# Patient Record
Sex: FEMALE | Race: WHITE | Employment: FULL TIME | URBAN - METROPOLITAN AREA
[De-identification: names, ages, dates, MRNs, and addresses within clinical notes are randomized per-mention and may not be internally consistent; named-entity substitution may affect disease eponyms.]

---

## 2020-05-05 PROBLEM — F41.8 DEPRESSION WITH ANXIETY: Status: ACTIVE | Noted: 2020-05-05

## 2020-05-05 PROBLEM — Z34.90 PREGNANCY: Status: ACTIVE | Noted: 2020-05-05

## 2020-05-05 PROBLEM — Z82.79 FAMILY HISTORY OF CONGENITAL HEART DEFECT: Status: ACTIVE | Noted: 2020-05-05

## 2020-05-05 PROBLEM — F17.200 SMOKER: Status: ACTIVE | Noted: 2020-05-05

## 2020-05-05 PROBLEM — F90.9 ADHD: Status: ACTIVE | Noted: 2020-05-05

## 2020-05-05 PROBLEM — O99.330 TOBACCO SMOKING AFFECTING PREGNANCY: Status: ACTIVE | Noted: 2020-05-05

## 2020-07-27 PROBLEM — O09.92 HIGH-RISK PREGNANCY IN SECOND TRIMESTER: Status: ACTIVE | Noted: 2020-05-05

## 2020-07-27 PROBLEM — O99.340 DEPRESSION AFFECTING PREGNANCY: Status: ACTIVE | Noted: 2020-05-05

## 2020-07-27 PROBLEM — O35.2XX0 HEREDITARY DISEASE IN FAMILY POSSIBLY AFFECTING FETUS: Status: ACTIVE | Noted: 2020-05-05

## 2020-07-27 PROBLEM — F90.9 ADHD: Status: RESOLVED | Noted: 2020-05-05 | Resolved: 2020-07-27

## 2020-07-27 PROBLEM — O99.332 TOBACCO SMOKING AFFECTING PREGNANCY IN SECOND TRIMESTER: Status: ACTIVE | Noted: 2020-05-05

## 2020-07-27 PROBLEM — F32.A DEPRESSION AFFECTING PREGNANCY: Status: ACTIVE | Noted: 2020-05-05

## 2020-12-01 PROBLEM — N90.4 VULVAR DYSTROPHY: Status: ACTIVE | Noted: 2020-12-01

## 2020-12-06 ENCOUNTER — ANESTHESIA EVENT (OUTPATIENT)
Dept: LABOR AND DELIVERY | Age: 20
End: 2020-12-06
Payer: COMMERCIAL

## 2020-12-07 ENCOUNTER — ANESTHESIA (OUTPATIENT)
Dept: LABOR AND DELIVERY | Age: 20
End: 2020-12-07
Payer: COMMERCIAL

## 2020-12-07 ENCOUNTER — HOSPITAL ENCOUNTER (INPATIENT)
Age: 20
LOS: 2 days | Discharge: HOME | End: 2020-12-09
Attending: OBSTETRICS & GYNECOLOGY | Admitting: OBSTETRICS & GYNECOLOGY
Payer: COMMERCIAL

## 2020-12-07 PROBLEM — Z3A.39 39 WEEKS GESTATION OF PREGNANCY: Status: ACTIVE | Noted: 2020-12-07

## 2020-12-07 LAB
ABO + RH BLD: NORMAL
ARTERIAL PATENCY WRIST A: ABNORMAL
ARTERIAL PATENCY WRIST A: ABNORMAL
BASE DEFICIT BLD-SCNC: 2 MMOL/L
BASE DEFICIT BLD-SCNC: 2 MMOL/L
BDY SITE: ABNORMAL
BDY SITE: ABNORMAL
BLOOD GROUP ANTIBODIES SERPL: NORMAL
CA-I BLD-MCNC: 1.55 MMOL/L (ref 1.12–1.32)
CA-I BLD-MCNC: 1.58 MMOL/L (ref 1.12–1.32)
COLLECT TIME,HTIME: 945
COLLECT TIME,HTIME: 945
ERYTHROCYTE [DISTWIDTH] IN BLOOD BY AUTOMATED COUNT: 13.5 % (ref 11.9–14.6)
GAS FLOW.O2 O2 DELIVERY SYS: ABNORMAL L/MIN
GAS FLOW.O2 O2 DELIVERY SYS: ABNORMAL L/MIN
GLUCOSE BLD STRIP.AUTO-MCNC: 60 MG/DL (ref 65–100)
GLUCOSE BLD STRIP.AUTO-MCNC: 71 MG/DL (ref 65–100)
HCO3 BLD-SCNC: 23.9 MMOL/L (ref 22–26)
HCO3 BLD-SCNC: 26.3 MMOL/L (ref 22–26)
HCT VFR BLD AUTO: 31.2 % (ref 35.8–46.3)
HGB BLD-MCNC: 10.1 G/DL (ref 11.7–15.4)
MCH RBC QN AUTO: 28.4 PG (ref 26.1–32.9)
MCHC RBC AUTO-ENTMCNC: 32.4 G/DL (ref 31.4–35)
MCV RBC AUTO: 87.6 FL (ref 79.6–97.8)
NRBC # BLD: 0.03 K/UL (ref 0–0.2)
PCO2 BLD: 43.1 MMHG (ref 35–45)
PCO2 BLD: 54.8 MMHG (ref 35–45)
PH BLD: 7.29 [PH] (ref 7.35–7.45)
PH BLD: 7.35 [PH] (ref 7.35–7.45)
PLATELET # BLD AUTO: 242 K/UL (ref 150–450)
PMV BLD AUTO: 12.3 FL (ref 9.4–12.3)
PO2 BLD: 13 MMHG (ref 75–100)
PO2 BLD: 20 MMHG (ref 75–100)
POTASSIUM BLD-SCNC: 5.3 MMOL/L (ref 3.5–5.1)
POTASSIUM BLD-SCNC: 5.4 MMOL/L (ref 3.5–5.1)
RBC # BLD AUTO: 3.56 M/UL (ref 4.05–5.2)
SAO2 % BLD: 13 % (ref 95–98)
SAO2 % BLD: 30 % (ref 95–98)
SERVICE CMNT-IMP: ABNORMAL
SERVICE CMNT-IMP: ABNORMAL
SODIUM BLD-SCNC: 137 MMOL/L (ref 136–145)
SODIUM BLD-SCNC: 138 MMOL/L (ref 136–145)
SPECIMEN EXP DATE BLD: NORMAL
SPECIMEN TYPE: ABNORMAL
SPECIMEN TYPE: ABNORMAL
WBC # BLD AUTO: 10.9 K/UL (ref 4.3–11.1)

## 2020-12-07 PROCEDURE — 74011250636 HC RX REV CODE- 250/636: Performed by: REGISTERED NURSE

## 2020-12-07 PROCEDURE — 65270000029 HC RM PRIVATE

## 2020-12-07 PROCEDURE — 2709999900 HC NON-CHARGEABLE SUPPLY

## 2020-12-07 PROCEDURE — 2709999900 HC NON-CHARGEABLE SUPPLY: Performed by: OBSTETRICS & GYNECOLOGY

## 2020-12-07 PROCEDURE — 82947 ASSAY GLUCOSE BLOOD QUANT: CPT

## 2020-12-07 PROCEDURE — 74011250636 HC RX REV CODE- 250/636: Performed by: ANESTHESIOLOGY

## 2020-12-07 PROCEDURE — 77030031139 HC SUT VCRL2 J&J -A: Performed by: OBSTETRICS & GYNECOLOGY

## 2020-12-07 PROCEDURE — 74011000250 HC RX REV CODE- 250: Performed by: REGISTERED NURSE

## 2020-12-07 PROCEDURE — 74011250637 HC RX REV CODE- 250/637: Performed by: ANESTHESIOLOGY

## 2020-12-07 PROCEDURE — 76010000391 HC C SECN FIRST 1 HR: Performed by: OBSTETRICS & GYNECOLOGY

## 2020-12-07 PROCEDURE — 82803 BLOOD GASES ANY COMBINATION: CPT

## 2020-12-07 PROCEDURE — 75410000003 HC RECOV DEL/VAG/CSECN EA 0.5 HR: Performed by: OBSTETRICS & GYNECOLOGY

## 2020-12-07 PROCEDURE — 77030002933 HC SUT MCRYL J&J -A: Performed by: OBSTETRICS & GYNECOLOGY

## 2020-12-07 PROCEDURE — 86900 BLOOD TYPING SEROLOGIC ABO: CPT

## 2020-12-07 PROCEDURE — 77030007880 HC KT SPN EPDRL BBMI -B: Performed by: ANESTHESIOLOGY

## 2020-12-07 PROCEDURE — 77030003665 HC NDL SPN BBMI -A: Performed by: ANESTHESIOLOGY

## 2020-12-07 PROCEDURE — 74011000258 HC RX REV CODE- 258: Performed by: OBSTETRICS & GYNECOLOGY

## 2020-12-07 PROCEDURE — 77030034696 HC CATH URETH FOL 2W BARD -A: Performed by: OBSTETRICS & GYNECOLOGY

## 2020-12-07 PROCEDURE — 74011000250 HC RX REV CODE- 250: Performed by: ANESTHESIOLOGY

## 2020-12-07 PROCEDURE — 77030032490 HC SLV COMPR SCD KNE COVD -B: Performed by: OBSTETRICS & GYNECOLOGY

## 2020-12-07 PROCEDURE — 74011250636 HC RX REV CODE- 250/636

## 2020-12-07 PROCEDURE — 76010000392 HC C SECN EA ADDL 0.5 HR: Performed by: OBSTETRICS & GYNECOLOGY

## 2020-12-07 PROCEDURE — 74011250637 HC RX REV CODE- 250/637: Performed by: OBSTETRICS & GYNECOLOGY

## 2020-12-07 PROCEDURE — 77030009363 HC CUP VAC EXTRCT CNCI -B: Performed by: OBSTETRICS & GYNECOLOGY

## 2020-12-07 PROCEDURE — 77030011943: Performed by: OBSTETRICS & GYNECOLOGY

## 2020-12-07 PROCEDURE — 85027 COMPLETE CBC AUTOMATED: CPT

## 2020-12-07 PROCEDURE — 76060000078 HC EPIDURAL ANESTHESIA: Performed by: OBSTETRICS & GYNECOLOGY

## 2020-12-07 PROCEDURE — 74011250636 HC RX REV CODE- 250/636: Performed by: OBSTETRICS & GYNECOLOGY

## 2020-12-07 RX ORDER — DEXAMETHASONE SODIUM PHOSPHATE 100 MG/10ML
INJECTION INTRAMUSCULAR; INTRAVENOUS AS NEEDED
Status: DISCONTINUED | OUTPATIENT
Start: 2020-12-07 | End: 2020-12-07 | Stop reason: HOSPADM

## 2020-12-07 RX ORDER — TRISODIUM CITRATE DIHYDRATE AND CITRIC ACID MONOHYDRATE 500; 334 MG/5ML; MG/5ML
30 SOLUTION ORAL ONCE
Status: COMPLETED | OUTPATIENT
Start: 2020-12-07 | End: 2020-12-07

## 2020-12-07 RX ORDER — ACETAMINOPHEN 500 MG
1000 TABLET ORAL EVERY 8 HOURS
Status: COMPLETED | OUTPATIENT
Start: 2020-12-07 | End: 2020-12-08

## 2020-12-07 RX ORDER — OXYTOCIN/RINGER'S LACTATE 30/500 ML
95 PLASTIC BAG, INJECTION (ML) INTRAVENOUS AS NEEDED
Status: DISCONTINUED | OUTPATIENT
Start: 2020-12-07 | End: 2020-12-07 | Stop reason: HOSPADM

## 2020-12-07 RX ORDER — MORPHINE SULFATE 1 MG/ML
INJECTION, SOLUTION EPIDURAL; INTRATHECAL; INTRAVENOUS
Status: COMPLETED | OUTPATIENT
Start: 2020-12-07 | End: 2020-12-07

## 2020-12-07 RX ORDER — DEXTROSE, SODIUM CHLORIDE, SODIUM LACTATE, POTASSIUM CHLORIDE, AND CALCIUM CHLORIDE 5; .6; .31; .03; .02 G/100ML; G/100ML; G/100ML; G/100ML; G/100ML
125 INJECTION, SOLUTION INTRAVENOUS CONTINUOUS
Status: DISCONTINUED | OUTPATIENT
Start: 2020-12-07 | End: 2020-12-07 | Stop reason: HOSPADM

## 2020-12-07 RX ORDER — FAMOTIDINE 20 MG/1
20 TABLET, FILM COATED ORAL ONCE
Status: COMPLETED | OUTPATIENT
Start: 2020-12-07 | End: 2020-12-07

## 2020-12-07 RX ORDER — OXYTOCIN/RINGER'S LACTATE 30/500 ML
10 PLASTIC BAG, INJECTION (ML) INTRAVENOUS AS NEEDED
Status: DISCONTINUED | OUTPATIENT
Start: 2020-12-07 | End: 2020-12-07 | Stop reason: HOSPADM

## 2020-12-07 RX ORDER — SODIUM CHLORIDE, SODIUM LACTATE, POTASSIUM CHLORIDE, CALCIUM CHLORIDE 600; 310; 30; 20 MG/100ML; MG/100ML; MG/100ML; MG/100ML
INJECTION, SOLUTION INTRAVENOUS
Status: DISCONTINUED | OUTPATIENT
Start: 2020-12-07 | End: 2020-12-07 | Stop reason: HOSPADM

## 2020-12-07 RX ORDER — ONDANSETRON 2 MG/ML
4 INJECTION INTRAMUSCULAR; INTRAVENOUS ONCE
Status: COMPLETED | OUTPATIENT
Start: 2020-12-07 | End: 2020-12-07

## 2020-12-07 RX ORDER — BUPIVACAINE HYDROCHLORIDE 7.5 MG/ML
INJECTION, SOLUTION INTRASPINAL
Status: COMPLETED | OUTPATIENT
Start: 2020-12-07 | End: 2020-12-07

## 2020-12-07 RX ORDER — SODIUM CHLORIDE 0.9 % (FLUSH) 0.9 %
5-40 SYRINGE (ML) INJECTION AS NEEDED
Status: DISCONTINUED | OUTPATIENT
Start: 2020-12-07 | End: 2020-12-07 | Stop reason: HOSPADM

## 2020-12-07 RX ORDER — HYDROMORPHONE HYDROCHLORIDE 1 MG/ML
1 INJECTION, SOLUTION INTRAMUSCULAR; INTRAVENOUS; SUBCUTANEOUS
Status: ACTIVE | OUTPATIENT
Start: 2020-12-07 | End: 2020-12-08

## 2020-12-07 RX ORDER — DIPHENHYDRAMINE HYDROCHLORIDE 50 MG/ML
12.5 INJECTION, SOLUTION INTRAMUSCULAR; INTRAVENOUS
Status: DISPENSED | OUTPATIENT
Start: 2020-12-07 | End: 2020-12-08

## 2020-12-07 RX ORDER — OXYCODONE HYDROCHLORIDE 5 MG/1
5 TABLET ORAL
Status: DISPENSED | OUTPATIENT
Start: 2020-12-07 | End: 2020-12-08

## 2020-12-07 RX ORDER — CLINDAMYCIN PHOSPHATE 900 MG/50ML
900 INJECTION INTRAVENOUS
Status: COMPLETED | OUTPATIENT
Start: 2020-12-07 | End: 2020-12-08

## 2020-12-07 RX ORDER — BUPROPION HYDROCHLORIDE 150 MG/1
150 TABLET, EXTENDED RELEASE ORAL 2 TIMES DAILY
Status: DISCONTINUED | OUTPATIENT
Start: 2020-12-07 | End: 2020-12-09 | Stop reason: HOSPADM

## 2020-12-07 RX ORDER — KETOROLAC TROMETHAMINE 30 MG/ML
INJECTION, SOLUTION INTRAMUSCULAR; INTRAVENOUS AS NEEDED
Status: DISCONTINUED | OUTPATIENT
Start: 2020-12-07 | End: 2020-12-07 | Stop reason: HOSPADM

## 2020-12-07 RX ORDER — KETOROLAC TROMETHAMINE 30 MG/ML
30 INJECTION, SOLUTION INTRAMUSCULAR; INTRAVENOUS
Status: DISPENSED | OUTPATIENT
Start: 2020-12-07 | End: 2020-12-08

## 2020-12-07 RX ORDER — CEFAZOLIN SODIUM/WATER 2 G/20 ML
2 SYRINGE (ML) INTRAVENOUS ONCE
Status: DISCONTINUED | OUTPATIENT
Start: 2020-12-07 | End: 2020-12-07

## 2020-12-07 RX ORDER — SODIUM CHLORIDE 0.9 % (FLUSH) 0.9 %
5-40 SYRINGE (ML) INJECTION EVERY 8 HOURS
Status: DISCONTINUED | OUTPATIENT
Start: 2020-12-07 | End: 2020-12-07 | Stop reason: HOSPADM

## 2020-12-07 RX ORDER — OXYTOCIN/RINGER'S LACTATE 30/500 ML
PLASTIC BAG, INJECTION (ML) INTRAVENOUS
Status: DISCONTINUED | OUTPATIENT
Start: 2020-12-07 | End: 2020-12-07 | Stop reason: HOSPADM

## 2020-12-07 RX ORDER — SODIUM CHLORIDE, SODIUM LACTATE, POTASSIUM CHLORIDE, CALCIUM CHLORIDE 600; 310; 30; 20 MG/100ML; MG/100ML; MG/100ML; MG/100ML
125 INJECTION, SOLUTION INTRAVENOUS CONTINUOUS
Status: DISCONTINUED | OUTPATIENT
Start: 2020-12-07 | End: 2020-12-08 | Stop reason: ALTCHOICE

## 2020-12-07 RX ORDER — PROMETHAZINE HYDROCHLORIDE 25 MG/ML
INJECTION, SOLUTION INTRAMUSCULAR; INTRAVENOUS AS NEEDED
Status: DISCONTINUED | OUTPATIENT
Start: 2020-12-07 | End: 2020-12-07 | Stop reason: HOSPADM

## 2020-12-07 RX ORDER — ONDANSETRON 2 MG/ML
INJECTION INTRAMUSCULAR; INTRAVENOUS
Status: DISCONTINUED
Start: 2020-12-07 | End: 2020-12-07

## 2020-12-07 RX ADMIN — PHENYLEPHRINE HYDROCHLORIDE 80 MCG: 10 INJECTION INTRAVENOUS at 09:42

## 2020-12-07 RX ADMIN — ACETAMINOPHEN 1000 MG: 500 TABLET, FILM COATED ORAL at 21:18

## 2020-12-07 RX ADMIN — SODIUM CHLORIDE, SODIUM LACTATE, POTASSIUM CHLORIDE, AND CALCIUM CHLORIDE 1000 ML: 600; 310; 30; 20 INJECTION, SOLUTION INTRAVENOUS at 07:20

## 2020-12-07 RX ADMIN — PHENYLEPHRINE HYDROCHLORIDE 160 MCG: 10 INJECTION INTRAVENOUS at 09:31

## 2020-12-07 RX ADMIN — PROMETHAZINE HYDROCHLORIDE 6.25 MG: 25 INJECTION INTRAMUSCULAR; INTRAVENOUS at 10:02

## 2020-12-07 RX ADMIN — PHENYLEPHRINE HYDROCHLORIDE 160 MCG: 10 INJECTION INTRAVENOUS at 09:50

## 2020-12-07 RX ADMIN — PHENYLEPHRINE HYDROCHLORIDE 80 MCG: 10 INJECTION INTRAVENOUS at 09:37

## 2020-12-07 RX ADMIN — SODIUM CHLORIDE, SODIUM LACTATE, POTASSIUM CHLORIDE, AND CALCIUM CHLORIDE: 600; 310; 30; 20 INJECTION, SOLUTION INTRAVENOUS at 09:20

## 2020-12-07 RX ADMIN — DIPHENHYDRAMINE HYDROCHLORIDE 12.5 MG: 50 INJECTION, SOLUTION INTRAMUSCULAR; INTRAVENOUS at 22:18

## 2020-12-07 RX ADMIN — ACETAMINOPHEN 1000 MG: 500 TABLET, FILM COATED ORAL at 13:02

## 2020-12-07 RX ADMIN — Medication 500 ML/HR: at 09:46

## 2020-12-07 RX ADMIN — PHENYLEPHRINE HYDROCHLORIDE 160 MCG: 10 INJECTION INTRAVENOUS at 09:46

## 2020-12-07 RX ADMIN — DEXAMETHASONE SODIUM PHOSPHATE 10 MG: 10 INJECTION INTRAMUSCULAR; INTRAVENOUS at 09:54

## 2020-12-07 RX ADMIN — SODIUM CITRATE AND CITRIC ACID MONOHYDRATE 30 ML: 500; 334 SOLUTION ORAL at 08:58

## 2020-12-07 RX ADMIN — PHENYLEPHRINE HYDROCHLORIDE 80 MCG: 10 INJECTION INTRAVENOUS at 09:39

## 2020-12-07 RX ADMIN — BUPIVACAINE HYDROCHLORIDE IN DEXTROSE 12 MG: 7.5 INJECTION, SOLUTION SUBARACHNOID at 09:27

## 2020-12-07 RX ADMIN — KETOROLAC TROMETHAMINE 30 MG: 30 INJECTION, SOLUTION INTRAMUSCULAR at 21:18

## 2020-12-07 RX ADMIN — BUPROPION HYDROCHLORIDE 150 MG: 150 TABLET, EXTENDED RELEASE ORAL at 21:20

## 2020-12-07 RX ADMIN — MORPHINE SULFATE 0.15 MG: 1 INJECTION, SOLUTION EPIDURAL; INTRATHECAL; INTRAVENOUS at 09:27

## 2020-12-07 RX ADMIN — SODIUM CHLORIDE, SODIUM LACTATE, POTASSIUM CHLORIDE, AND CALCIUM CHLORIDE: 600; 310; 30; 20 INJECTION, SOLUTION INTRAVENOUS at 10:11

## 2020-12-07 RX ADMIN — ONDANSETRON 4 MG: 2 INJECTION INTRAMUSCULAR; INTRAVENOUS at 08:57

## 2020-12-07 RX ADMIN — PHENYLEPHRINE HYDROCHLORIDE 160 MCG: 10 INJECTION INTRAVENOUS at 09:35

## 2020-12-07 RX ADMIN — SODIUM CHLORIDE 500 ML: 900 INJECTION, SOLUTION INTRAVENOUS at 16:49

## 2020-12-07 RX ADMIN — PHENYLEPHRINE HYDROCHLORIDE 160 MCG: 10 INJECTION INTRAVENOUS at 09:33

## 2020-12-07 RX ADMIN — KETOROLAC TROMETHAMINE 30 MG: 30 INJECTION, SOLUTION INTRAMUSCULAR; INTRAVENOUS at 10:09

## 2020-12-07 RX ADMIN — CLINDAMYCIN IN 5 PERCENT DEXTROSE 900 MG: 18 INJECTION, SOLUTION INTRAVENOUS at 08:58

## 2020-12-07 RX ADMIN — GENTAMICIN SULFATE 360 MG: 40 INJECTION, SOLUTION INTRAMUSCULAR; INTRAVENOUS at 09:11

## 2020-12-07 RX ADMIN — SODIUM CHLORIDE, SODIUM LACTATE, POTASSIUM CHLORIDE, AND CALCIUM CHLORIDE 125 ML/HR: 600; 310; 30; 20 INJECTION, SOLUTION INTRAVENOUS at 17:26

## 2020-12-07 RX ADMIN — FAMOTIDINE 20 MG: 20 TABLET, FILM COATED ORAL at 08:59

## 2020-12-07 NOTE — PROGRESS NOTES
SBAR OUT Report: Mother    Verbal report given to Katya Vernon RN  (full name & credentials) on this patient, who is now being transferred to MIU (unit) for routine progression of care. The patient is wearing a green \"Anesthesia-Duramorph\" band. Report consisted of patient's Situation, Background, Assessment and Recommendations (SBAR). [place] ID bands were compared with the identification form, and verified with the patient and receiving nurse. Information from the SBAR and the [address] [facility] Report was reviewed with the receiving nurse; opportunity for questions and clarification provided.

## 2020-12-07 NOTE — OP NOTES
INTRAUTERINE PREGNANCY  SECTION     Pedro Mcpherson  [id number]    DATE OF PROCEDURE:  2020    PREOPERATIVE DIAGNOSIS:  INDU  Primary  Section presumed macrosomia    POSTOPERATIVE DIAGNOSIS:  Same as preoperative diagnosis      with operative delivery of a 10 lb 6 oz male with Apgars of 8 and 9. ADDITIONAL DIAGNOSES: none    PROCEDURE: Intrauterine pregnancy,  section low transverse    SURGEON:  Major Whitehead MD    ASSISTANT:  none    ANESTHESIA: Spinal    EBL: 615 ml    SPECIMENS:   ID Type Source Tests Collected by Time Destination   1 :  Placenta   Marcelino MD Doug 2020 1007 Discarded        COMPLICATIONS: none    OPERATIVE PROCEDURE: Patient was placed on the operating room table in the supine position/left lateral tilt, santiago catheter in place, pneumatic compression hose on and operating. Time out was done to confirm the operating procedure, surgeon, patient and site. Once confirmed by the team, procedure was started. The patient was prepped and draped in the usual fashion for abdominal surgery. Adequate anesthesia was confirmed, A Pfannenstiel incision was made and carried down sharply through the skin, subcutaneous tissue, and fascia. Fascia was sharply dissected free from underlying rectus muscles. The peritoneum was sharply entered and extended vertically with good visualization of bowel and bladder. The bladder blade was then placed over the bladder. The visceroperitoneal reflection over the lower uterine segment was incised transversely and the bladder flap sharply and bluntly developed. The bladder blade was reinserted in this space. A low transverse hysterotomy incision was made with return of clear fluid then extended bluntly, and the infants head was delivered from the pelvis with gentle fundal pressure and vacuum applied with one pull less than 30 seconds at 400mm/mercury. The cord was clamped and cut. Mouth and nose were suctioned clean.  The infant was given to the [facility] personel present at the time of delivery. The placenta was delivered with fundal massage. The uterus was exteriorized, wrapped in a wet lap square, curetted with a dry square, and closed in a double-layered fashion with 0-vicryl. Hemostasis appeared adequate. The cul-de-sac was then irrigated and suctioned clean. Bladder flap was irrigated. The uterus was replaced in the abdomen. The gutters were irrigated and suctioned clean. The hysterotomy incision was noted to be hemostatic. The peritoneum was closed with 3-0 vicryl and rectus muscle reapproximated with 0-vicryl. The subfascial layer made hemostatic with electrocautery. Fascia closed with #1-vicryl running. Subcutaneous tissue irrigated, noted to be hemostatic and reapproximated with 3-0 vicryl. Skin then closed with 4-0 monocryl in a subcuticular fashion. All sponge, lap, instrument, and needle counts correct times two. The patient tolerated the procedure well and went back to her room in satisfactory condition. Infant was with the mother.

## 2020-12-07 NOTE — ROUTINE PROCESS
SBAR IN Report: Mother    Verbal report received from Martha Wells RN on this patient, who is now being transferred from L&D (unit) for routine progression of care. The patient is wearing a green \"Anesthesia-Duramorph\" band. Report consisted of patient's Situation, Background, Assessment and Recommendations (SBAR).  ID bands were compared with the identification form, and verified with the patient and transferring nurse. Information from the SBAR, [place], OR Summary, Procedure Summary, Intake/Output, MAR and Recent Results and the [place] Report was reviewed with the transferring nurse; opportunity for questions and clarification provided.

## 2020-12-07 NOTE — ANESTHESIA POSTPROCEDURE EVALUATION
Procedure(s):   SECTION. spinal    Anesthesia Post Evaluation        Patient location during evaluation: floor  Patient participation: complete - patient participated  Level of consciousness: awake  Pain management: satisfactory to patient  Airway patency: patent  Anesthetic complications: no  Cardiovascular status: hemodynamically stable  Respiratory status: spontaneous ventilation  Hydration status: euvolemic  Post anesthesia nausea and vomiting:  none      INITIAL Post-op Vital signs: No vitals data found for the desired time range.

## 2020-12-07 NOTE — PROGRESS NOTES
Patient in MIU PACU   1025 SBAR from [employer].  CRNA, care of patient resumed   1030  Recovery began

## 2020-12-07 NOTE — ANESTHESIA PROCEDURE NOTES
Spinal Block    Performed by: Jen Goyal MD  Authorized by: Jen Goyal MD     Pre-procedure: Indications: primary anesthetic  Preanesthetic Checklist: patient identified, risks and benefits discussed, anesthesia consent, patient being monitored and timeout performed    Timeout Time: 09:23  Preanesthetic Checklist comment:  Risk of nerve damage discussed.     Spinal Block:   Patient Position:  Seated  Prep Region:  Lumbar  Prep: chlorhexidine and patient draped      Location:  L3-4  Technique:  Single shot    Local Dose (mL):  3    Needle:   Needle Type:  Pencan  Needle Gauge:  25 G  Attempts:  1      Events: CSF confirmed, no blood with aspiration and no paresthesia        Assessment:  Insertion:  Uncomplicated  Patient tolerance:  Patient tolerated the procedure well with no immediate complications

## 2020-12-07 NOTE — H&P
History & Physical    Name: Mikhail Rios MRN: [id number]  SSN: [id number]    YOB: 2000  Age: 23 y.o. Sex: female      Subjective:     Estimated Date of Delivery: 20  OB History    Para Term  AB Living   1 0 0 0 0 0   SAB TAB Ectopic Molar Multiple Live Births   0 0 0 0 0 0      # Outcome Date GA Lbr Jose Carlos/2nd Weight Sex Delivery Anes PTL Lv   1 Current                MsAfshan Gonzalez admitted with pregnancy at 39w2d for  section due to suspected macrosomia and unengaged presenting part. Prenatal course was normal. Please see prenatal records for details. Past Medical History:   Diagnosis Date    ADHD 2020    unmedicated    Depression with anxiety     Other ill-defined conditions(799.89) ENLARGED T/A    Psychiatric disorder ADHD     Past Surgical History:   Procedure Laterality Date    HX TONSILLECTOMY      HX WISDOM TEETH EXTRACTION       Social History     Occupational History    Not on file   Tobacco Use    Smoking status: Current Every Day Smoker     Packs/day: 0.25    Smokeless tobacco: Never Used   Substance and Sexual Activity    Alcohol use: No    Drug use: No    Sexual activity: Yes     Partners: Male     Birth control/protection: None     Family History   Problem Relation Age of Onset    Cancer Mother     No Known Problems Father     Malignant Hyperthermia Neg Hx     Pseudocholinesterase Deficiency Neg Hx     Delayed Awakening Neg Hx     Post-op Nausea/Vomiting Neg Hx     Emergence Delirium Neg Hx     Post-op Cognitive Dysfunction Neg Hx     Other Neg Hx        Allergies   Allergen Reactions    Penicillins Hives    Sulfamethoxazole-Trimethoprim Hives and Shortness of Breath     Prior to Admission medications    Medication Sig Start Date End Date Taking? Authorizing Provider   acetaminophen (Tylenol Extra Strength) 500 mg tablet Take  by mouth every six (6) hours as needed for Pain.    Yes Provider, Historical   buPROPion XL (WELLBUTRIN XL) 150 mg tablet Take 1 Tab by mouth every morning. 20  Yes Sanjiv Ribeiro MD   WTE82-Csbn Fumarate-FA-DSS-DHA 26-1.2- mg cap Take  by mouth. Yes Provider, Historical        Review of Systems: A comprehensive review of systems was negative except for that written in the History of Present Illness. Objective:     Vitals:  Vitals:    20 0727   BP: (!) 135/91   Pulse: (!) 102   Resp: 18   Temp: 97.9 °F (36.6 °C)   Weight: 220 lb (99.8 kg)   Height: 5' 3\" (1.6 m)        Physical Exam:  Patient without distress. Heart: Regular rate and rhythm  Lung: clear to auscultation throughout lung fields, no wheezes, no rales, no rhonchi and normal respiratory effort  Membranes:  Intact  Fetal Heart Rate: Reactive    Prenatal Labs:   Lab Results   Component Value Date/Time    ABO/Rh(D) A POSITIVE 2020 07:21 AM    Rubella, External immune 2020    HBsAg, External negative 2020    HIV, External NR 2020    RPR, External NR 2020    ABO,Rh A positive 2020         Impression/Plan:     Plan:  Admit for  section. Group B Strep was negative. Discussed the risks of surgery including the risks of bleeding, infection, deep vein thrombosis, and surgical injuries to internal organs including but not limited to the bowels, bladder, rectum, and female reproductive organs. The patient understands the risks; any and all questions were answered to the patient's satisfaction.   Prior reaction to PCN required treatment with steroids therefore will treat with Clinda and Chyna Reinoso

## 2020-12-07 NOTE — PROGRESS NOTES
Dr Toan Chaves notified of decreased urine output, /62, heart rate 60, one pad with moderate amount of lochia 2 hours ago now small lochia since. Telephone order received for 500 ml bolus of Normal Saline then restart LR as ordered. Read back.

## 2020-12-07 NOTE — L&D DELIVERY NOTE
Delivery Summary    Patient: Leonor Vega MRN: [id number]  SSN: [id number]    YOB: 2000  Age: 23 y.o. Sex: female        Information for the patient's :  Neisha Jolly [[id number]]       Labor Events:    Labor: No    Steroids: None   Cervical Ripening Date/Time:       Cervical Ripening Type: None   Antibiotics During Labor: No   Rupture Identifier:      Rupture Date/Time: 2020 9:44 AM   Rupture Type: AROM   Amniotic Fluid Volume: Moderate    Amniotic Fluid Description: Clear    Amniotic Fluid Odor: None    Induction: None       Induction Date/Time:        Indications for Induction:      Augmentation: None   Augmentation Date/Time:      Indications for Augmentation:     Labor complications: None       Additional complications:        Delivery Events:  Indications For Episiotomy:     Episiotomy: None   Perineal Laceration(s): None   Repaired:     Periurethral Laceration Location:      Repaired:     Labial Laceration Location:     Repaired:     Sulcal Laceration Location:     Repaired:     Vaginal Laceration Location:     Repaired:     Cervical Laceration Location:     Repaired:     Repair Suture: None   Number of Repair Packets:     Estimated Blood Loss (ml):  ml   Quantitaive Blood Loss (ml):             Delivery Date: 2020    Delivery Time: 9:45 AM   Delivery Type: , Low Transverse     Details    Trial of Labor: No   Primary/Repeat: Primary   Priority: Routine   Indications:  Macrosomia       Sex:  Male     Gestational Age: 44w2d  Delivery Clinician: Phong Heredia  Living Status: Living   Delivery Location: OR            APGARS  One minute Five minutes Ten minutes   Skin color: 0   1        Heart rate: 2   2        Grimace: 2   2        Muscle tone: 2   2        Breathin   2        Totals: 8   9          Presentation: Vertex    Position: Left Occiput Anterior  Resuscitation Method:  Suctioning-bulb; Tactile Stimulation Meconium Stained: None      Cord Information: 3 Vessels  Complications: None  Cord around:    Delayed cord clamping? No  Cord clamped date/time:2020  9:46 AM  Disposition of Cord Blood: Lab    Blood Gases Sent?: Yes    Placenta:  Date/Time: 2020  9:48 AM  Removal: Expressed      Appearance: Intact     [place] Measurements:  Birth Weight: 10 lb 6.1 oz (4.71 kg)      Birth Length: 1' 10.24\" (0.565 m)      Head Circumference: 1' 2.57\" (0.37 m)      Chest Circumference: 1' 2.76\" (0.375 m)     Abdominal Girth:       Other Providers:   Natalie CORTES;PASTOR MANCERA;EDY REYES;PASTOR LEIJA;FIFI GEORGE;KELSY ZAVALA;MARIANO ZHENG;DARCI ESQUIVEL;DAYA HOANG, Obstetrician;Primary Nurse;Primary [place] Nurse;Neonatologist;Anesthesiologist;Crna;Scrub Tech;Scrub Tech;Respiratory Therapist             Group B Strep: No results found for: GRBSEXT, GRBSEXT  Information for the patient's :  Roberta Duggan [[id number]]   No results found for: ABORH, PCTABR, PCTDIG, BILI, ABORHEXT, ABORH     Recent Labs     20  1009 20  1008   HCO3I 23.9 26.3*   SO2I 30* 13*   IBD 2 2   SPECTI VENOUS CORD ARTERIAL CORD   ISITE CORD CORD   IDEV OTHER OTHER   IALLEN NOT APPLICABLE NOT APPLICABLE      Vacuum applied for less than 30 sec at 400mmMercury

## 2020-12-07 NOTE — ANESTHESIA PREPROCEDURE EVALUATION
Relevant Problems   No relevant active problems       Anesthetic History   No history of anesthetic complications            Review of Systems / Medical History  Pertinent labs reviewed    Pulmonary          Smoker         Neuro/Psych         Psychiatric history     Cardiovascular  Within defined limits                Exercise tolerance: >4 METS     GI/Hepatic/Renal  Within defined limits              Endo/Other        Obesity     Other Findings              Physical Exam    Airway  Mallampati: II  TM Distance: 4 - 6 cm  Neck ROM: normal range of motion   Mouth opening: Normal     Cardiovascular  Regular rate and rhythm,  S1 and S2 normal,  no murmur, click, rub, or gallop             Dental  No notable dental hx       Pulmonary  Breath sounds clear to auscultation               Abdominal  GI exam deferred       Other Findings            Anesthetic Plan    ASA: 2  Anesthesia type: spinal            Anesthetic plan and risks discussed with: Patient and Spouse

## 2020-12-07 NOTE — PROGRESS NOTES
IV started blood collected and sent to lab   Consents witnessed   Admission Database 1 & 2 complete   Admission assessment as noted   POC discussed with patient, opportunity given to ask questions and voice concerns   Patient to call RN prn needs

## 2020-12-08 LAB
ERYTHROCYTE [DISTWIDTH] IN BLOOD BY AUTOMATED COUNT: 13.4 % (ref 11.9–14.6)
GLUCOSE BLD STRIP.AUTO-MCNC: 119 MG/DL (ref 65–100)
HCT VFR BLD AUTO: 23.4 % (ref 35.8–46.3)
HCT VFR BLD AUTO: 24.3 % (ref 35.8–46.3)
HGB BLD-MCNC: 7.4 G/DL (ref 11.7–15.4)
HGB BLD-MCNC: 8.1 G/DL (ref 11.7–15.4)
MCH RBC QN AUTO: 28.8 PG (ref 26.1–32.9)
MCHC RBC AUTO-ENTMCNC: 33.3 G/DL (ref 31.4–35)
MCV RBC AUTO: 86.5 FL (ref 79.6–97.8)
NRBC # BLD: 0 K/UL (ref 0–0.2)
PLATELET # BLD AUTO: 218 K/UL (ref 150–450)
PMV BLD AUTO: 12.4 FL (ref 9.4–12.3)
RBC # BLD AUTO: 2.81 M/UL (ref 4.05–5.2)
WBC # BLD AUTO: 12.9 K/UL (ref 4.3–11.1)

## 2020-12-08 PROCEDURE — 85018 HEMOGLOBIN: CPT

## 2020-12-08 PROCEDURE — 2709999900 HC NON-CHARGEABLE SUPPLY

## 2020-12-08 PROCEDURE — 36415 COLL VENOUS BLD VENIPUNCTURE: CPT

## 2020-12-08 PROCEDURE — 77030027138 HC INCENT SPIROMETER -A

## 2020-12-08 PROCEDURE — 74011000250 HC RX REV CODE- 250: Performed by: ANESTHESIOLOGY

## 2020-12-08 PROCEDURE — 82962 GLUCOSE BLOOD TEST: CPT

## 2020-12-08 PROCEDURE — 74011250637 HC RX REV CODE- 250/637: Performed by: OBSTETRICS & GYNECOLOGY

## 2020-12-08 PROCEDURE — 74011250636 HC RX REV CODE- 250/636: Performed by: ANESTHESIOLOGY

## 2020-12-08 PROCEDURE — 65270000029 HC RM PRIVATE

## 2020-12-08 PROCEDURE — 74011250637 HC RX REV CODE- 250/637: Performed by: ANESTHESIOLOGY

## 2020-12-08 PROCEDURE — 85027 COMPLETE CBC AUTOMATED: CPT

## 2020-12-08 RX ORDER — SODIUM CHLORIDE 0.9 % (FLUSH) 0.9 %
5-40 SYRINGE (ML) INJECTION EVERY 8 HOURS
Status: DISCONTINUED | OUTPATIENT
Start: 2020-12-08 | End: 2020-12-09

## 2020-12-08 RX ORDER — SODIUM CHLORIDE 0.9 % (FLUSH) 0.9 %
5-40 SYRINGE (ML) INJECTION AS NEEDED
Status: DISCONTINUED | OUTPATIENT
Start: 2020-12-08 | End: 2020-12-08 | Stop reason: ALTCHOICE

## 2020-12-08 RX ORDER — SODIUM CHLORIDE, SODIUM LACTATE, POTASSIUM CHLORIDE, CALCIUM CHLORIDE 600; 310; 30; 20 MG/100ML; MG/100ML; MG/100ML; MG/100ML
100 INJECTION, SOLUTION INTRAVENOUS CONTINUOUS
Status: DISCONTINUED | OUTPATIENT
Start: 2020-12-08 | End: 2020-12-08 | Stop reason: ALTCHOICE

## 2020-12-08 RX ORDER — ACETAMINOPHEN 325 MG/1
650 TABLET ORAL
Status: DISCONTINUED | OUTPATIENT
Start: 2020-12-08 | End: 2020-12-09 | Stop reason: HOSPADM

## 2020-12-08 RX ORDER — OXYCODONE HYDROCHLORIDE 5 MG/1
5 TABLET ORAL
Status: DISCONTINUED | OUTPATIENT
Start: 2020-12-08 | End: 2020-12-09 | Stop reason: HOSPADM

## 2020-12-08 RX ORDER — ZOLPIDEM TARTRATE 5 MG/1
5 TABLET ORAL ONCE
Status: COMPLETED | OUTPATIENT
Start: 2020-12-08 | End: 2020-12-08

## 2020-12-08 RX ORDER — IBUPROFEN 800 MG/1
800 TABLET ORAL EVERY 6 HOURS
Status: DISCONTINUED | OUTPATIENT
Start: 2020-12-08 | End: 2020-12-09 | Stop reason: HOSPADM

## 2020-12-08 RX ORDER — SIMETHICONE 80 MG
80 TABLET,CHEWABLE ORAL
Status: DISCONTINUED | OUTPATIENT
Start: 2020-12-08 | End: 2020-12-09 | Stop reason: HOSPADM

## 2020-12-08 RX ORDER — DOCUSATE SODIUM 100 MG/1
100 CAPSULE, LIQUID FILLED ORAL 2 TIMES DAILY
Status: DISCONTINUED | OUTPATIENT
Start: 2020-12-08 | End: 2020-12-09 | Stop reason: HOSPADM

## 2020-12-08 RX ORDER — ONDANSETRON 4 MG/1
4 TABLET, ORALLY DISINTEGRATING ORAL
Status: DISCONTINUED | OUTPATIENT
Start: 2020-12-08 | End: 2020-12-09 | Stop reason: HOSPADM

## 2020-12-08 RX ORDER — NALOXONE HYDROCHLORIDE 0.4 MG/ML
0.4 INJECTION, SOLUTION INTRAMUSCULAR; INTRAVENOUS; SUBCUTANEOUS AS NEEDED
Status: DISCONTINUED | OUTPATIENT
Start: 2020-12-08 | End: 2020-12-09 | Stop reason: HOSPADM

## 2020-12-08 RX ORDER — SERTRALINE HYDROCHLORIDE 50 MG/1
100 TABLET, FILM COATED ORAL EVERY EVENING
Status: DISCONTINUED | OUTPATIENT
Start: 2020-12-08 | End: 2020-12-09 | Stop reason: HOSPADM

## 2020-12-08 RX ADMIN — SIMETHICONE 80 MG: 80 TABLET, CHEWABLE ORAL at 18:08

## 2020-12-08 RX ADMIN — ONDANSETRON 4 MG: 4 TABLET, ORALLY DISINTEGRATING ORAL at 21:09

## 2020-12-08 RX ADMIN — ONDANSETRON 4 MG: 4 TABLET, ORALLY DISINTEGRATING ORAL at 14:57

## 2020-12-08 RX ADMIN — SIMETHICONE 80 MG: 80 TABLET, CHEWABLE ORAL at 21:09

## 2020-12-08 RX ADMIN — KETOROLAC TROMETHAMINE 30 MG: 30 INJECTION, SOLUTION INTRAMUSCULAR at 03:28

## 2020-12-08 RX ADMIN — ACETAMINOPHEN 1000 MG: 500 TABLET, FILM COATED ORAL at 06:15

## 2020-12-08 RX ADMIN — DOCUSATE SODIUM 100 MG: 100 CAPSULE, LIQUID FILLED ORAL at 18:09

## 2020-12-08 RX ADMIN — SERTRALINE HYDROCHLORIDE 100 MG: 50 TABLET ORAL at 18:09

## 2020-12-08 RX ADMIN — ZOLPIDEM TARTRATE 5 MG: 5 TABLET ORAL at 22:39

## 2020-12-08 RX ADMIN — PROMETHAZINE HYDROCHLORIDE 6.25 MG: 25 INJECTION INTRAMUSCULAR; INTRAVENOUS at 03:38

## 2020-12-08 RX ADMIN — OXYCODONE 5 MG: 5 TABLET ORAL at 14:58

## 2020-12-08 RX ADMIN — BUPROPION HYDROCHLORIDE 150 MG: 150 TABLET, EXTENDED RELEASE ORAL at 09:07

## 2020-12-08 RX ADMIN — KETOROLAC TROMETHAMINE 30 MG: 30 INJECTION, SOLUTION INTRAMUSCULAR at 11:59

## 2020-12-08 RX ADMIN — DOCUSATE SODIUM 100 MG: 100 CAPSULE, LIQUID FILLED ORAL at 14:58

## 2020-12-08 RX ADMIN — IBUPROFEN 800 MG: 800 TABLET ORAL at 18:09

## 2020-12-08 RX ADMIN — OXYCODONE 5 MG: 5 TABLET ORAL at 03:40

## 2020-12-08 RX ADMIN — OXYCODONE 5 MG: 5 TABLET ORAL at 09:07

## 2020-12-08 RX ADMIN — OXYCODONE 5 MG: 5 TABLET ORAL at 20:00

## 2020-12-08 NOTE — PROGRESS NOTES
Post-Operative Day Number 1 Progress Note    Patient doing well post-op day 1 from  delivery without significant complaints. Pain controlled on current medication. Voiding without difficulty, normal lochia. Vitals:    Patient Vitals for the past 8 hrs:   BP Temp Pulse Resp SpO2   20 0310 122/81 98 °F (36.7 °C) 67 18 97 %     Temp (24hrs), Av.8 °F (36.6 °C), Min:97 °F (36.1 °C), Max:98.4 °F (36.9 °C)      Vital signs stable, afebrile. Exam:  Patient without distress. Abdomen soft, fundus firm at level of umbilicus, non tender. Incision dry and clean without erythema. Lower extremities are negative for swelling, cords or tenderness. Lab/Data Review:  CBC:   Lab Results   Component Value Date/Time    HGB 7.4 (L) 2020 06:10 AM    HCT 23.4 (L) 2020 06:10 AM       Assessment and Plan:  Patient appears to be having uncomplicated post- course. Continue routine post-op care and maternal education. Vital stable and ambulating with good UOP.   Will check cbc at noon

## 2020-12-08 NOTE — PROGRESS NOTES
Patient assisted to bathroom. Patient passed one golf ball size clot in hat and one nickel size clot on peripad noted. Patient bleeding been rubra,small to moderate. Fundus firm midline and at umbilicus. Will continue to monitor.

## 2020-12-08 NOTE — PROGRESS NOTES
Chart reviewed - first time parent; depression/anxiety. SW met with patient/FOB while social distancing w/mask. Patient confirms experiencing depression/anxiety. She is currently taking Wellbutrin. Patient was taking both Zoloft and Wellbutrin until 3 months into pregnancy. Patient then John Albino out of Zoloft\" and had difficulty getting refills from her PCP. She subsequently spoke to her OB about this and was told to \"just keep taking the Wellbutrin. \"  Patient verbalizes that she felt emotionally better when taking both the Zoloft & Wellbutrin. Patient would like to speak to her OB about resuming the Zoloft in addition to the Wellbutrin. SW offered to provide counseling resource as she has not explored therapy in the past; patient declined the need for this information. Patient/FOB Phyllis Adams) reside together and state that they have a strong support system of local family members.  provided education on [facility] Postpartum [place] Home Visit. At this time, [facility] is completing this  home visit telephonically due to social distancing. Family would like to participate in program.  Referral will be made at discharge. PCP is Dr. Melo Osborn at [address] [place] [address], [id number] (chart updated). Patient given informational packet on  mood & anxiety disorders (resources/education). Family denies any additional needs from  at this time. Family has 's contact information should any needs/questions arise.     GABRIEL Xavier-NATALIE  [facility]

## 2020-12-08 NOTE — PROGRESS NOTES
Problem:  Delivery: Day of Delivery  Goal: Activity/Safety  Outcome: Progressing Towards Goal  Goal: Nutrition/Diet  Outcome: Progressing Towards Goal  Goal: Medications  Outcome: Progressing Towards Goal  Goal: Respiratory  Outcome: Progressing Towards Goal  Goal: Psychosocial  Outcome: Progressing Towards Goal  Goal: *Vital signs within defined limits  Outcome: Progressing Towards Goal  Goal: *Labs within defined limits  Outcome: Progressing Towards Goal  Goal: *Hemodynamically stable  Outcome: Progressing Towards Goal  Goal: *Optimal pain control at patient's stated goal  Outcome: Progressing Towards Goal  Goal: *Participates in infant care  Outcome: Progressing Towards Goal  Goal: *Demonstrates progressive activity  Outcome: Progressing Towards Goal  Goal: *Tolerating diet  Outcome: Progressing Towards Goal

## 2020-12-08 NOTE — PROGRESS NOTES
Anesthesiology  Post-op Note    Post-op day 1 s/p  via spinal with neuraxial opioids for post-op pain management. Visit Vitals  /81 (BP 1 Location: Right arm, BP Patient Position: At rest)   Pulse 67   Temp 36.7 °C (98 °F)   Resp 18   Ht 5' 3\" (1.6 m)   Wt 99.8 kg (220 lb)   LMP 2020   SpO2 97%   Breastfeeding Unknown   BMI 38.97 kg/m²     Airway patent, patient appropriately hydrated and appears euvolemic. Patient is Alert and oriented. Pain is well controlled. Pruritus is absent. Nausea is absent. No complaints about back or site of injection. Motor and sensory function has returned to baseline in lower extremities. Patient is satisfied with anesthetic and reports no complications. Continue current orders, then initiate surgeon's orders for pain management 24 hours after . Follow up per surgeon.

## 2020-12-08 NOTE — PROGRESS NOTES
Patient up to bathroom with RN assistance. Katie-care taught and completed. Questions encouraged and answered. Patient sitting up on couch, encouraged to call for needs or concerns. Verbalizes understanding. Chaves and SCD's discontinued at this time. Patient is unable to void at this time.

## 2020-12-08 NOTE — PROGRESS NOTES
Patient medicated for pain as charted. Patient states feels like Wellbutrin is not working and wants to go back on Zoloft and Wellbutrin as before. Patient states \"I am real jittery and need something for that. \"  Patient admits she is a smoker and refuses a blood patch. Spot check blood sugar taken. Notified Dr. Felicita Dougherty. MD states she will look at the chart and call me back.

## 2020-12-09 VITALS
SYSTOLIC BLOOD PRESSURE: 128 MMHG | HEART RATE: 77 BPM | TEMPERATURE: 98.3 F | BODY MASS INDEX: 38.98 KG/M2 | OXYGEN SATURATION: 98 % | WEIGHT: 220 LBS | RESPIRATION RATE: 16 BRPM | DIASTOLIC BLOOD PRESSURE: 80 MMHG | HEIGHT: 63 IN

## 2020-12-09 PROCEDURE — 2709999900 HC NON-CHARGEABLE SUPPLY

## 2020-12-09 PROCEDURE — 90715 TDAP VACCINE 7 YRS/> IM: CPT | Performed by: OBSTETRICS & GYNECOLOGY

## 2020-12-09 PROCEDURE — 74011250636 HC RX REV CODE- 250/636: Performed by: OBSTETRICS & GYNECOLOGY

## 2020-12-09 PROCEDURE — 74011250637 HC RX REV CODE- 250/637: Performed by: OBSTETRICS & GYNECOLOGY

## 2020-12-09 RX ORDER — SERTRALINE HYDROCHLORIDE 100 MG/1
100 TABLET, FILM COATED ORAL EVERY EVENING
Qty: 30 TAB | Refills: 12 | Status: SHIPPED | OUTPATIENT
Start: 2020-12-09

## 2020-12-09 RX ORDER — BUPROPION HYDROCHLORIDE 150 MG/1
150 TABLET, EXTENDED RELEASE ORAL 2 TIMES DAILY
Qty: 60 TAB | Refills: 12 | Status: SHIPPED | OUTPATIENT
Start: 2020-12-09

## 2020-12-09 RX ORDER — OXYCODONE HYDROCHLORIDE 5 MG/1
5 TABLET ORAL
Qty: 20 TAB | Refills: 0 | Status: SHIPPED | OUTPATIENT
Start: 2020-12-09 | End: 2020-12-14

## 2020-12-09 RX ORDER — IBUPROFEN 800 MG/1
800 TABLET ORAL
Qty: 40 TAB | Refills: 1 | Status: SHIPPED | OUTPATIENT
Start: 2020-12-09

## 2020-12-09 RX ADMIN — SIMETHICONE 80 MG: 80 TABLET, CHEWABLE ORAL at 12:43

## 2020-12-09 RX ADMIN — IBUPROFEN 800 MG: 800 TABLET ORAL at 06:14

## 2020-12-09 RX ADMIN — IBUPROFEN 800 MG: 800 TABLET ORAL at 12:43

## 2020-12-09 RX ADMIN — SIMETHICONE 80 MG: 80 TABLET, CHEWABLE ORAL at 10:06

## 2020-12-09 RX ADMIN — OXYCODONE 5 MG: 5 TABLET ORAL at 13:40

## 2020-12-09 RX ADMIN — TETANUS TOXOID, REDUCED DIPHTHERIA TOXOID AND ACELLULAR PERTUSSIS VACCINE, ADSORBED 0.5 ML: 5; 2.5; 8; 8; 2.5 SUSPENSION INTRAMUSCULAR at 10:05

## 2020-12-09 RX ADMIN — ACETAMINOPHEN 650 MG: 325 TABLET, FILM COATED ORAL at 10:06

## 2020-12-09 RX ADMIN — DOCUSATE SODIUM 100 MG: 100 CAPSULE, LIQUID FILLED ORAL at 10:06

## 2020-12-09 RX ADMIN — BUPROPION HYDROCHLORIDE 150 MG: 150 TABLET, EXTENDED RELEASE ORAL at 10:06

## 2020-12-09 RX ADMIN — OXYCODONE 5 MG: 5 TABLET ORAL at 06:14

## 2020-12-09 RX ADMIN — ONDANSETRON 4 MG: 4 TABLET, ORALLY DISINTEGRATING ORAL at 13:30

## 2020-12-09 RX ADMIN — OXYCODONE 5 MG: 5 TABLET ORAL at 10:06

## 2020-12-09 NOTE — PROGRESS NOTES
Patient discharged to home per MD orders. Discharge instructions reviewed with patient. Questions encouraged and answered. Patient verbalizes understanding.       Patient to call out when ready to be discharged

## 2020-12-09 NOTE — PROGRESS NOTES
While Dr. Alley Burciaga at bedside, states patient does not have to record intake and output anymore

## 2020-12-09 NOTE — DISCHARGE INSTRUCTIONS
Patient Education        After Your Delivery (the Postpartum Period): Care Instructions  Your Care Instructions     Congratulations on the birth of your baby. Like pregnancy, the  period can be a time of excitement, jeffery, and exhaustion. You may look at your wondrous little baby and feel happy. You may also be overwhelmed by your new sleep hours and new responsibilities. At first, babies often sleep during the days and are awake at night. They do not have a pattern or routine. They may make sudden gasps, jerk themselves awake, or look like they have crossed eyes. These are all normal, and they may even make you smile. In these first weeks after delivery, try to take good care of yourself. It may take 4 to 6 weeks to feel like yourself again, and possibly longer if you had a  birth. You will likely feel very tired for several weeks. Your days will be full of ups and downs, but lots of jeffery as well. Follow-up care is a key part of your treatment and safety. Be sure to make and go to all appointments, and call your doctor if you are having problems. It's also a good idea to know your test results and keep a list of the medicines you take. How can you care for yourself at home? Take care of your body after delivery  · Use pads instead of tampons for the bloody flow that may last as long as 2 weeks. · Ease cramps with ibuprofen (Advil, Motrin). · Ease soreness of hemorrhoids and the area between your vagina and rectum with ice compresses or witch hazel pads. · Ease constipation by drinking lots of fluid and eating high-fiber foods. Ask your doctor about over-the-counter stool softeners. · Cleanse yourself with a gentle squeeze of warm water from a bottle instead of wiping with toilet paper. · Take a sitz bath in warm water several times a day. · Wear a good nursing bra. Ease sore and swollen breasts with warm, wet washcloths.   · If you are not breastfeeding, use ice rather than heat for breast soreness. · Your period may not start for several months if you are breastfeeding. You may bleed more, and longer at first, than you did before you got pregnant. · Wait until you are healed (about 4 to 6 weeks) before you have sexual intercourse. Your doctor will tell you when it is okay to have sex. · Try not to travel with your baby for 5 or 6 weeks. If you take a long car trip, make frequent stops to walk around and stretch. Avoid exhaustion  · Rest every day. Try to nap when your baby naps. · Ask another adult to be with you for a few days after delivery. · Plan for  if you have other children. · Stay flexible so you can eat at odd hours and sleep when you need to. Both you and your baby are making new schedules. · Plan small trips to get out of the house. Change can make you feel less tired. · Ask for help with housework, cooking, and shopping. Remind yourself that your job is to care for your baby. Know about help for postpartum depression  · \"Baby blues\" are common for the first 1 to 2 weeks after birth. You may cry or feel sad or irritable for no reason. · Rest whenever you can. Being tired makes it harder to handle your emotions. · Go for walks with your baby. · Talk to your partner, friends, and family about your feelings. · If your symptoms last for more than a few weeks, or if you feel very depressed, ask your doctor for help. · Postpartum depression can be treated. Support groups and counseling can help. Sometimes medicine can also help. Stay healthy  · Eat healthy foods so you have more energy and lose extra baby pounds. · If you breastfeed, avoid drugs. If you quit smoking during pregnancy, try to stay smoke-free. If you choose to have a drink now and then, have only one drink, and limit the number of occasions that you have a drink. Wait to breastfeed at least 2 hours after you have a drink to reduce the amount of alcohol the baby may get in the milk.   · Start daily exercise after 4 to 6 weeks, but rest when you feel tired. · Learn exercises to tone your belly. Do Kegel exercises to regain strength in your pelvic muscles. You can do these exercises while you stand or sit. ? Squeeze the same muscles you would use to stop your urine. Your belly and thighs should not move. ? Hold the squeeze for 3 seconds, and then relax for 3 seconds. ? Start with 3 seconds. Then add 1 second each week until you are able to squeeze for 10 seconds. ? Repeat the exercise 10 to 15 times for each session. Do three or more sessions each day. · Find a class for new mothers and new babies that has an exercise time. · If you had a  birth, give yourself a bit more time before you exercise, and be careful. When should you call for help? Call  911 anytime you think you may need emergency care. For example, call if:    · You have thoughts of harming yourself, your baby, or another person.     · You passed out (lost consciousness).     · You have chest pain, are short of breath, or cough up blood.     · You have a seizure. Call your doctor now or seek immediate medical care if:    · You have severe vaginal bleeding. This means you are passing blood clots and soaking through a pad each hour for 2 or more hours.     · You are dizzy or lightheaded, or you feel like you may faint.     · You have a fever.     · You have new or more belly pain.     · You have signs of a blood clot in your leg (called a deep vein thrombosis), such as:  ? Pain in the calf, back of the knee, thigh, or groin. ? Redness and swelling in your leg or groin.     · You have signs of preeclampsia, such as:  ? Sudden swelling of your face, hands, or feet. ? New vision problems (such as dimness, blurring, or seeing spots). ? A severe headache.    Watch closely for changes in your health, and be sure to contact your doctor if:    · Your vaginal bleeding seems to be getting heavier.     · You have new or worse vaginal discharge.     · You feel sad, anxious, or hopeless for more than a few days.     · You do not get better as expected. Where can you learn more? Go to http://www.[employer]com/  Enter A461 in the search box to learn more about \"After Your Delivery (the Postpartum Period): Care Instructions. \"  Current as of: 2020               Content Version: 12.6   [employer]. Care instructions adapted under license by [employer] (which disclaims liability or warranty for this information). If you have questions about a medical condition or this instruction, always ask your healthcare professional. [place] [address] any warranty or liability for your use of this information. DISCHARGE SUMMARY from Nurse    PATIENT INSTRUCTIONS:    After general anesthesia or intravenous sedation, for 24 hours or while taking prescription Narcotics:  · Limit your activities  · Do not drive and operate hazardous machinery  · Do not make important personal or business decisions  · Do  not drink alcoholic beverages  · If you have not urinated within 8 hours after discharge, please contact your surgeon on call. Report the following to your surgeon:  · Excessive pain, swelling, redness or odor of or around the surgical area  · Temperature over 100.5  · Nausea and vomiting lasting longer than 4 hours or if unable to take medications  · Any signs of decreased circulation or nerve impairment to extremity: change in color, persistent  numbness, tingling, coldness or increase pain  · Any questions    What to do at Home:      Discharge instruction to follow:    Activity: Pelvis rest for 6 weeks, Nothing in vagina for 6 weeks     No heavy lifting over 15 lbs for 2 weeks     No driving for 2 weeks, No driving if taking narcotics     No push/pull motion such as sweeping or vacuuming for 2 weeks     No tub baths or swimming for 6 weeks     section keep incision clean and dry, may shower as normal with soap and water. Inspect incision every day for signs of infection listed below. Continue to use maegan-bottle with every void or bowel movement until comfortable stopping. Change sanitary pad after each urination or bowel movement. Call MD for the following:      Fever over 101 F; pain not relieved by medication; foul smelling vaginal discharge or increase in vaginal bleeding. Redness, swelling, or drainage from  incision. Take medication as prescribed. Follow up with MD as order. *  Please give a list of your current medications to your Primary Care Provider. *  Please update this list whenever your medications are discontinued, doses are      changed, or new medications (including over-the-counter products) are added. *  Please carry medication information at all times in case of emergency situations. These are general instructions for a healthy lifestyle:    No smoking/ No tobacco products/ Avoid exposure to second hand smoke  Surgeon General's Warning:  Quitting smoking now greatly reduces serious risk to your health. Obesity, smoking, and sedentary lifestyle greatly increases your risk for illness    A healthy diet, regular physical exercise & weight monitoring are important for maintaining a healthy lifestyle    You may be retaining fluid if you have a history of heart failure or if you experience any of the following symptoms:  Weight gain of 3 pounds or more overnight or 5 pounds in a week, increased swelling in our hands or feet or shortness of breath while lying flat in bed. Please call your doctor as soon as you notice any of these symptoms; do not wait until your next office visit. The discharge information has been reviewed with the patient. The patient verbalized understanding.   Discharge medications reviewed with the patient and appropriate educational materials and side effects teaching were provided.   ___________________________________________________________________________________________________________________________________

## 2020-12-09 NOTE — PROGRESS NOTES
12/08/20 2000   Pain 1   Pain Scale 1 Numeric (0 - 10)   Pain Intensity 1 6   Patient Stated Pain Goal 6   Pain Location 1 Abdomen; Incisional   Pain Orientation 1 Lower   Pain Description 1 Burning   Pain Intervention(s) 1 Medication (see MAR)   Patient Observation   Observations oxycodone given for pain   Activity Family/Visitors present; In bed

## 2020-12-09 NOTE — DISCHARGE SUMMARY
[facility] [address] [facility] Discharge Summary     Patient ID:  Tabitha Arroyo  [id number]  44 y.o.  2000    Admit date: 2020    Discharge date and time: No discharge date for patient encounter. Admitting Physician: Troy Chaudhry MD     Discharge Physician: Misty Warner M.D. Admission Diagnoses: 39 weeks gestation of pregnancy [Z3A.39]; Delivery of pregnancy by  section [O82]    Problem List: Hospital - Principal Problem:    39 weeks gestation of pregnancy (2020)    Active Problems:    Delivery of pregnancy by  section (2020)     ; Other -   Patient Active Problem List   Diagnosis Code    High-risk pregnancy in second trimester O09.92    Depression affecting pregnancy O99.340, F32.9    Tobacco smoking affecting pregnancy in second trimester O99.332    Hereditary disease in family possibly affecting fetus O32. 2XX0    Macrosomia P08.0    Vulvar dystrophy N90.4    39 weeks gestation of pregnancy Z3A.39    Delivery of pregnancy by  section O82        Discharge Diagnoses: 39 weeks gestation of pregnancy [Z3A.39]; Delivery of pregnancy by  section [O82]    Hospital Course: Tabitha Arroyo had unremarkeable progressive recovery. and Eating, ambulating, and voiding in a routine manner.     Significant Diagnostic Studies:   Recent Results (from the past 24 hour(s))   CBC W/O DIFF    Collection Time: 20 11:59 AM   Result Value Ref Range    WBC 12.9 (H) 4.3 - 11.1 K/uL    RBC 2.81 (L) 4.05 - 5.2 M/uL    HGB 8.1 (L) 11.7 - 15.4 g/dL    HCT 24.3 (L) 35.8 - 46.3 %    MCV 86.5 79.6 - 97.8 FL    MCH 28.8 26.1 - 32.9 PG    MCHC 33.3 31.4 - 35.0 g/dL    RDW 13.4 11.9 - 14.6 %    PLATELET 314 888 - 473 K/uL    MPV 12.4 (H) 9.4 - 12.3 FL    ABSOLUTE NRBC 0.00 0.0 - 0.2 K/uL   GLUCOSE, POC    Collection Time: 20  3:27 PM   Result Value Ref Range    Glucose (POC) 119 (H) 65 - 100 mg/dL       Procedures: primary  section, low transverse incision    Discharge Exam:  Visit Vitals  /80 (BP 1 Location: Left arm, BP Patient Position: At rest)   Pulse 77   Temp 98.3 °F (36.8 °C)   Resp 16   Ht 5' 3\" (1.6 m)   Wt 220 lb (99.8 kg)   LMP 02/09/2020   SpO2 98%   Breastfeeding Unknown   BMI 38.97 kg/m²        Heart: regular rate and rhythm, S1, S2 normal, no murmur, click, rub or gallop  Lungs:clear to auscultation bilaterally  Extremities: normal, atraumatic, no cyanosis or edema. No DVT  Incision/episiotomy: no significant drainage, no dehiscence, no significant erythema    Patient Instructions:   Current Discharge Medication List      START taking these medications    Details   buPROPion SR (WELLBUTRIN SR) 150 mg SR tablet Take 1 Tab by mouth two (2) times a day. Indications: anxiousness associated with depression  Qty: 60 Tab, Refills: 12      ibuprofen (MOTRIN) 800 mg tablet Take 1 Tab by mouth every eight (8) hours as needed for Pain. Indications: pain  Qty: 40 Tab, Refills: 1      oxyCODONE IR (ROXICODONE) 5 mg immediate release tablet Take 1 Tab by mouth every six (6) hours as needed for Pain for up to 5 days. Max Daily Amount: 20 mg. Indications: pain  Qty: 20 Tab, Refills: 0    Associated Diagnoses: Macrosomia      sertraline (ZOLOFT) 100 mg tablet Take 1 Tab by mouth every evening. Indications: anxiousness associated with depression  Qty: 30 Tab, Refills: 12         CONTINUE these medications which have NOT CHANGED    Details   PNV66-Iron Fumarate-FA-DSS-DHA 26-1.2- mg cap Take  by mouth.          STOP taking these medications       acetaminophen (Tylenol Extra Strength) 500 mg tablet Comments:   Reason for Stopping:         buPROPion XL (WELLBUTRIN XL) 150 mg tablet Comments:   Reason for Stopping:              Activity: physical activity is restricted per discharge instructions  Diet: resume normal diet  Wound Care: Keep wound clean and dry, as directed  Patient is requesting to go back on zoloft, Rx given with her Wellbutrin; thinks she may have PP depression, follow up 1 week  Follow-up with [facility] in 1week.     Signed:  Bobby Flores MD  12/9/2020  10:16 AM

## 2020-12-09 NOTE — PROGRESS NOTES
Patient requesting to help her with sleep. Dr. Abdifatah Gee called and received to give Ambien 5mg once for sleep. Verbal readback provided.

## 2020-12-09 NOTE — PROGRESS NOTES
SW follow-up with patient/FOB due to EPDS score of 11. Initial assessment was completed yesterday and family was provided informational packet on  mood and anxiety disorders. Resources reviewed in more depth today. Additionally, patient has been placed back on Zoloft by OB. Patient agreeable for SW to call in a few weeks to check-in. Also, patient has this 's contact information for any needs/questions.     GABRIEL Delacruz-NATALIE  [facility]

## 2020-12-09 NOTE — PROGRESS NOTES
OB notified of EPDS score (11) via fax.     GABRIEL Ruano-NATALIE  [address]

## 2020-12-09 NOTE — PROGRESS NOTES
Problem:  Delivery: Postpartum Day 2  Goal: Activity/Safety  Outcome: Progressing Towards Goal  Goal: Nutrition/Diet  Outcome: Progressing Towards Goal  Goal: *Vital signs within defined limits  Outcome: Progressing Towards Goal  Goal: *Labs within defined limits  Outcome: Progressing Towards Goal  Goal: *Hemodynamically stable  Outcome: Progressing Towards Goal  Goal: *Optimal pain control at patient's stated goal  Outcome: Not Progressing Towards Goal  Goal: *Participates in infant care  Outcome: Not Progressing Towards Goal  Goal: *Demonstrates progressive activity  Outcome: Not Progressing Towards Goal  Goal: *Appropriate parent-infant bonding  Outcome: Not Progressing Towards Goal  Goal: *Tolerating diet  Outcome: Not Progressing Towards Goal

## 2020-12-11 ENCOUNTER — HOSPITAL ENCOUNTER (INPATIENT)
Age: 20
LOS: 2 days | Discharge: HOME | DRG: 776 | End: 2020-12-13
Attending: OBSTETRICS & GYNECOLOGY | Admitting: OBSTETRICS & GYNECOLOGY
Payer: COMMERCIAL

## 2020-12-11 ENCOUNTER — APPOINTMENT (OUTPATIENT)
Dept: CT IMAGING | Age: 20
DRG: 776 | End: 2020-12-11
Attending: OBSTETRICS & GYNECOLOGY
Payer: COMMERCIAL

## 2020-12-11 DIAGNOSIS — I50.21 ACUTE SYSTOLIC CONGESTIVE HEART FAILURE (HCC): ICD-10-CM

## 2020-12-11 PROBLEM — I50.9 ACUTE CHF (CONGESTIVE HEART FAILURE) (HCC): Status: ACTIVE | Noted: 2020-12-11

## 2020-12-11 PROBLEM — R06.02 SOB (SHORTNESS OF BREATH): Status: ACTIVE | Noted: 2020-12-11

## 2020-12-11 PROBLEM — R50.9 FEVER: Status: ACTIVE | Noted: 2020-12-11

## 2020-12-11 LAB
ALBUMIN SERPL-MCNC: 2.7 G/DL (ref 3.5–5)
ALBUMIN/GLOB SERPL: 0.8 {RATIO} (ref 1.2–3.5)
ALP SERPL-CCNC: 163 U/L (ref 50–130)
ALT SERPL-CCNC: 158 U/L (ref 12–65)
ANION GAP SERPL CALC-SCNC: 10 MMOL/L (ref 7–16)
APPEARANCE UR: ABNORMAL
AST SERPL-CCNC: 77 U/L (ref 15–37)
BACTERIA URNS QL MICRO: ABNORMAL /HPF
BASOPHILS # BLD: 0.1 K/UL (ref 0–0.2)
BASOPHILS NFR BLD: 0 % (ref 0–2)
BILIRUB SERPL-MCNC: 0.5 MG/DL (ref 0.2–1.1)
BILIRUB UR QL: NEGATIVE
BNP SERPL-MCNC: 6214 PG/ML (ref 5–125)
BUN SERPL-MCNC: 6 MG/DL (ref 6–23)
CALCIUM SERPL-MCNC: 8.6 MG/DL (ref 8.3–10.4)
CASTS URNS QL MICRO: ABNORMAL /LPF
CHLORIDE SERPL-SCNC: 107 MMOL/L (ref 98–107)
CO2 SERPL-SCNC: 23 MMOL/L (ref 21–32)
COLOR UR: ABNORMAL
CREAT SERPL-MCNC: 0.47 MG/DL (ref 0.6–1)
CREAT UR-MCNC: 150 MG/DL
DIFFERENTIAL METHOD BLD: ABNORMAL
EOSINOPHIL # BLD: 0.1 K/UL (ref 0–0.8)
EOSINOPHIL NFR BLD: 1 % (ref 0.5–7.8)
EPI CELLS #/AREA URNS HPF: ABNORMAL /HPF
ERYTHROCYTE [DISTWIDTH] IN BLOOD BY AUTOMATED COUNT: 13.6 % (ref 11.9–14.6)
GLOBULIN SER CALC-MCNC: 3.5 G/DL (ref 2.3–3.5)
GLUCOSE SERPL-MCNC: 83 MG/DL (ref 65–100)
GLUCOSE UR STRIP.AUTO-MCNC: NEGATIVE MG/DL
HCT VFR BLD AUTO: 28.5 % (ref 35.8–46.3)
HGB BLD-MCNC: 9.4 G/DL (ref 11.7–15.4)
HGB UR QL STRIP: ABNORMAL
IMM GRANULOCYTES # BLD AUTO: 0.1 K/UL (ref 0–0.5)
IMM GRANULOCYTES NFR BLD AUTO: 0 % (ref 0–5)
KETONES UR QL STRIP.AUTO: NEGATIVE MG/DL
LACTATE SERPL-SCNC: 0.5 MMOL/L (ref 0.4–2)
LDH SERPL L TO P-CCNC: 247 U/L (ref 100–190)
LEUKOCYTE ESTERASE UR QL STRIP.AUTO: ABNORMAL
LYMPHOCYTES # BLD: 1.5 K/UL (ref 0.5–4.6)
LYMPHOCYTES NFR BLD: 9 % (ref 13–44)
MCH RBC QN AUTO: 28 PG (ref 26.1–32.9)
MCHC RBC AUTO-ENTMCNC: 33 G/DL (ref 31.4–35)
MCV RBC AUTO: 84.8 FL (ref 79.6–97.8)
MONOCYTES # BLD: 1.4 K/UL (ref 0.1–1.3)
MONOCYTES NFR BLD: 9 % (ref 4–12)
NEUTS SEG # BLD: 12.8 K/UL (ref 1.7–8.2)
NEUTS SEG NFR BLD: 80 % (ref 43–78)
NITRITE UR QL STRIP.AUTO: NEGATIVE
NRBC # BLD: 0.07 K/UL (ref 0–0.2)
PH UR STRIP: 6.5 [PH] (ref 5–9)
PLATELET # BLD AUTO: 381 K/UL (ref 150–450)
PMV BLD AUTO: 11.2 FL (ref 9.4–12.3)
POTASSIUM SERPL-SCNC: 3.4 MMOL/L (ref 3.5–5.1)
PROT SERPL-MCNC: 6.2 G/DL (ref 6.3–8.2)
PROT UR STRIP-MCNC: 100 MG/DL
PROT UR-MCNC: 129 MG/DL
PROT/CREAT UR-RTO: 0.9
RBC # BLD AUTO: 3.36 M/UL (ref 4.05–5.2)
RBC #/AREA URNS HPF: >100 /HPF
SODIUM SERPL-SCNC: 140 MMOL/L (ref 136–145)
SP GR UR REFRACTOMETRY: 1.02 (ref 1–1.02)
URATE SERPL-MCNC: 4.6 MG/DL (ref 2.6–6)
UROBILINOGEN UR QL STRIP.AUTO: 1 EU/DL (ref 0.2–1)
WBC # BLD AUTO: 16 K/UL (ref 4.3–11.1)
WBC URNS QL MICRO: >100 /HPF

## 2020-12-11 PROCEDURE — 74011000258 HC RX REV CODE- 258: Performed by: OBSTETRICS & GYNECOLOGY

## 2020-12-11 PROCEDURE — 87086 URINE CULTURE/COLONY COUNT: CPT

## 2020-12-11 PROCEDURE — 81001 URINALYSIS AUTO W/SCOPE: CPT

## 2020-12-11 PROCEDURE — 74011250636 HC RX REV CODE- 250/636: Performed by: OBSTETRICS & GYNECOLOGY

## 2020-12-11 PROCEDURE — 36415 COLL VENOUS BLD VENIPUNCTURE: CPT

## 2020-12-11 PROCEDURE — 85025 COMPLETE CBC W/AUTO DIFF WBC: CPT

## 2020-12-11 PROCEDURE — 74011250637 HC RX REV CODE- 250/637: Performed by: OBSTETRICS & GYNECOLOGY

## 2020-12-11 PROCEDURE — 99283 EMERGENCY DEPT VISIT LOW MDM: CPT

## 2020-12-11 PROCEDURE — 65270000029 HC RM PRIVATE

## 2020-12-11 PROCEDURE — 71260 CT THORAX DX C+: CPT

## 2020-12-11 PROCEDURE — 87635 SARS-COV-2 COVID-19 AMP PRB: CPT

## 2020-12-11 PROCEDURE — 83615 LACTATE (LD) (LDH) ENZYME: CPT

## 2020-12-11 PROCEDURE — 83605 ASSAY OF LACTIC ACID: CPT

## 2020-12-11 PROCEDURE — 80053 COMPREHEN METABOLIC PANEL: CPT

## 2020-12-11 PROCEDURE — 84550 ASSAY OF BLOOD/URIC ACID: CPT

## 2020-12-11 PROCEDURE — 83880 ASSAY OF NATRIURETIC PEPTIDE: CPT

## 2020-12-11 PROCEDURE — 74011000636 HC RX REV CODE- 636: Performed by: OBSTETRICS & GYNECOLOGY

## 2020-12-11 PROCEDURE — 84156 ASSAY OF PROTEIN URINE: CPT

## 2020-12-11 PROCEDURE — 99223 1ST HOSP IP/OBS HIGH 75: CPT | Performed by: OBSTETRICS & GYNECOLOGY

## 2020-12-11 RX ORDER — SODIUM CHLORIDE 0.9 % (FLUSH) 0.9 %
5-40 SYRINGE (ML) INJECTION AS NEEDED
Status: DISCONTINUED | OUTPATIENT
Start: 2020-12-11 | End: 2020-12-13

## 2020-12-11 RX ORDER — SERTRALINE HYDROCHLORIDE 50 MG/1
100 TABLET, FILM COATED ORAL EVERY EVENING
Status: DISCONTINUED | OUTPATIENT
Start: 2020-12-11 | End: 2020-12-13 | Stop reason: HOSPADM

## 2020-12-11 RX ORDER — BUPROPION HYDROCHLORIDE 150 MG/1
150 TABLET, EXTENDED RELEASE ORAL DAILY
Status: DISCONTINUED | OUTPATIENT
Start: 2020-12-11 | End: 2020-12-13 | Stop reason: HOSPADM

## 2020-12-11 RX ORDER — SODIUM CHLORIDE 0.9 % (FLUSH) 0.9 %
10 SYRINGE (ML) INJECTION
Status: COMPLETED | OUTPATIENT
Start: 2020-12-11 | End: 2020-12-11

## 2020-12-11 RX ORDER — ONDANSETRON 4 MG/1
4 TABLET, ORALLY DISINTEGRATING ORAL
Status: DISCONTINUED | OUTPATIENT
Start: 2020-12-11 | End: 2020-12-13 | Stop reason: HOSPADM

## 2020-12-11 RX ORDER — ACETAMINOPHEN 650 MG/1
650 SUPPOSITORY RECTAL
Status: DISCONTINUED | OUTPATIENT
Start: 2020-12-11 | End: 2020-12-11

## 2020-12-11 RX ORDER — FUROSEMIDE 10 MG/ML
40 INJECTION INTRAMUSCULAR; INTRAVENOUS DAILY
Status: DISCONTINUED | OUTPATIENT
Start: 2020-12-11 | End: 2020-12-12

## 2020-12-11 RX ORDER — SODIUM CHLORIDE 0.9 % (FLUSH) 0.9 %
5-40 SYRINGE (ML) INJECTION EVERY 8 HOURS
Status: DISCONTINUED | OUTPATIENT
Start: 2020-12-11 | End: 2020-12-13

## 2020-12-11 RX ORDER — IBUPROFEN 600 MG/1
600 TABLET ORAL
Status: DISCONTINUED | OUTPATIENT
Start: 2020-12-11 | End: 2020-12-13 | Stop reason: HOSPADM

## 2020-12-11 RX ORDER — ACETAMINOPHEN 325 MG/1
650 TABLET ORAL
Status: DISCONTINUED | OUTPATIENT
Start: 2020-12-11 | End: 2020-12-13 | Stop reason: HOSPADM

## 2020-12-11 RX ORDER — ONDANSETRON 2 MG/ML
4 INJECTION INTRAMUSCULAR; INTRAVENOUS
Status: DISCONTINUED | OUTPATIENT
Start: 2020-12-11 | End: 2020-12-13 | Stop reason: HOSPADM

## 2020-12-11 RX ORDER — LORAZEPAM 1 MG/1
1 TABLET ORAL
Status: DISCONTINUED | OUTPATIENT
Start: 2020-12-11 | End: 2020-12-13 | Stop reason: HOSPADM

## 2020-12-11 RX ORDER — POTASSIUM CHLORIDE 20 MEQ/1
40 TABLET, EXTENDED RELEASE ORAL 2 TIMES DAILY
Status: DISCONTINUED | OUTPATIENT
Start: 2020-12-11 | End: 2020-12-13 | Stop reason: HOSPADM

## 2020-12-11 RX ORDER — SODIUM CHLORIDE 9 MG/ML
75 INJECTION, SOLUTION INTRAVENOUS CONTINUOUS
Status: DISCONTINUED | OUTPATIENT
Start: 2020-12-11 | End: 2020-12-13

## 2020-12-11 RX ADMIN — FUROSEMIDE 40 MG: 10 INJECTION, SOLUTION INTRAMUSCULAR; INTRAVENOUS at 13:41

## 2020-12-11 RX ADMIN — LORAZEPAM 1 MG: 1 TABLET ORAL at 14:05

## 2020-12-11 RX ADMIN — SERTRALINE HYDROCHLORIDE 100 MG: 50 TABLET ORAL at 17:07

## 2020-12-11 RX ADMIN — POTASSIUM CHLORIDE 40 MEQ: 1500 TABLET, EXTENDED RELEASE ORAL at 14:05

## 2020-12-11 RX ADMIN — Medication 10 ML: at 11:16

## 2020-12-11 RX ADMIN — SODIUM CHLORIDE 75 ML/HR: 900 INJECTION, SOLUTION INTRAVENOUS at 23:26

## 2020-12-11 RX ADMIN — SODIUM CHLORIDE 75 ML/HR: 900 INJECTION, SOLUTION INTRAVENOUS at 13:44

## 2020-12-11 RX ADMIN — LORAZEPAM 1 MG: 1 TABLET ORAL at 20:16

## 2020-12-11 RX ADMIN — SODIUM CHLORIDE 100 ML: 900 INJECTION, SOLUTION INTRAVENOUS at 11:15

## 2020-12-11 RX ADMIN — BUPROPION HYDROCHLORIDE 150 MG: 150 TABLET, EXTENDED RELEASE ORAL at 14:05

## 2020-12-11 RX ADMIN — IOPAMIDOL 100 ML: 755 INJECTION, SOLUTION INTRAVENOUS at 11:15

## 2020-12-11 NOTE — PROGRESS NOTES
0840 urine sent to lab  0846 iv started 20 in right ac NS 75 cc begins after lab draw. 0847 blood sent to lab.will send cultures for ARMAND'S covid when receive the swabs. 1107 pt to Xray. [id number] Kimmy Schilling reviewing chart.

## 2020-12-11 NOTE — PROGRESS NOTES
Chart reviewed - patient admitted for fever. SW is familiar with patient as she just delivered a baby boy (Deidra Saeed) on 12/7/20. EPDS = 11.     SW met with patient/FOB while social distancing w/mask. Patient has picked up her Zoloft and been taking this medication regularly since discharge. Family denied any needs from  at this time. SW will continue to follow.     GABRIEL Jimenez-CP  [address] [facility]

## 2020-12-11 NOTE — PROGRESS NOTES
D/c Dr Zarina Peters findings of CT and BNP  At this time elevated BP will treat for CHF pulmonary edema and treat with iv lasix  D/c this with pt and FOB  Desires baby to come to hospital- approved by [facility]  PT upset that she has to be here but explained severity of condition and need to treat  All ? S answered

## 2020-12-11 NOTE — H&P
Subjective:     Abhilash Anthony is a 23 y.o.  4 days s/p scheduled C section for fetal macrosomia. Surgery and postop course were uneventful. Pt presents with 2-3 day h/o nausea, vomiting, fevers (to 102 at home), chills, SOB, dysuria, and back pain. . Pt denies any known exposure to COVID. Pt notes normal BMs. She denies LE pain/swelling, abdominal/pelvic pain, vaginal discharge, CP, HA, scotomata, URI symptoms. Pt denies any significant PMH. Patient Active Problem List    Diagnosis Date Noted    SOB (shortness of breath) 2020    Fever 2020    39 weeks gestation of pregnancy 2020    Delivery of pregnancy by  section 2020    Vulvar dystrophy 2020    Macrosomia 2020    High-risk pregnancy in second trimester 2020    Depression affecting pregnancy 2020    Tobacco smoking affecting pregnancy in second trimester 2020    Hereditary disease in family possibly affecting fetus 2020     Past Medical History:   Diagnosis Date    ADHD 2020    unmedicated    Depression with anxiety     Other ill-defined conditions(799.89) ENLARGED T/A    Psychiatric disorder ADHD      Past Surgical History:   Procedure Laterality Date    HX TONSILLECTOMY      HX WISDOM TEETH EXTRACTION        Social History     Tobacco Use    Smoking status: Current Every Day Smoker     Packs/day: 0.25    Smokeless tobacco: Never Used   Substance Use Topics    Alcohol use: No      Family History   Problem Relation Age of Onset    Cancer Mother     No Known Problems Father     Malignant Hyperthermia Neg Hx     Pseudocholinesterase Deficiency Neg Hx     Delayed Awakening Neg Hx     Post-op Nausea/Vomiting Neg Hx     Emergence Delirium Neg Hx     Post-op Cognitive Dysfunction Neg Hx     Other Neg Hx       Prior to Admission Medications   Prescriptions Last Dose Informant Patient Reported? Taking?    PNV66-Iron Fumarate-FA-DSS-DHA 26-1.2- mg cap Yes No   Sig: Take  by mouth. buPROPion SR (WELLBUTRIN SR) 150 mg SR tablet   No No   Sig: Take 1 Tab by mouth two (2) times a day. Indications: anxiousness associated with depression   ibuprofen (MOTRIN) 800 mg tablet   No No   Sig: Take 1 Tab by mouth every eight (8) hours as needed for Pain. Indications: pain   oxyCODONE IR (ROXICODONE) 5 mg immediate release tablet   No No   Sig: Take 1 Tab by mouth every six (6) hours as needed for Pain for up to 5 days. Max Daily Amount: 20 mg. Indications: pain   sertraline (ZOLOFT) 100 mg tablet   No No   Sig: Take 1 Tab by mouth every evening. Indications: anxiousness associated with depression      Facility-Administered Medications: None     Allergies   Allergen Reactions    Penicillins Hives    Sulfamethoxazole-Trimethoprim Hives and Shortness of Breath        Review of Systems:  10 point ROS,  A comprehensive review of systems was negative except for that written in the History of Present Illness. Objective:     Patient Vitals for the past 8 hrs:   BP Pulse SpO2 Height Weight   12/11/20 0725    5' 3\" (1.6 m) 90.7 kg (200 lb)   12/11/20 0713 (!) 144/95 (!) 107 97 %       No data recorded. No intake/output data recorded. Physical Exam:   Visit Vitals  BP (!) 144/95   Pulse (!) 107   Ht 5' 3\" (1.6 m)   Wt 90.7 kg (200 lb)   LMP 02/09/2020   SpO2 97%   BMI 35.43 kg/m²     General appearance: alert, cooperative, no distress, appears stated age  Head: Normocephalic, without obvious abnormality, atraumatic  Throat: Lips, mucosa, and tongue normal. Teeth and gums normal  Neck: supple, symmetrical, trachea midline, no adenopathy, thyroid: not enlarged, symmetric, no tenderness/mass/nodules, no carotid bruit and no JVD  Back: symmetric, no curvature. ROM normal. No CVA tenderness.   Lungs: clear to auscultation bilaterally  Breasts: normal appearance, no masses or tenderness  Heart: tachycardic rate and rhythm, S1, S2 normal, no murmur, click, rub or gallop  Abdomen: soft, non-tender. Bowel sounds normal. No masses,  no organomegaly  Incision: C/D/I; no evidence of infection  Extremities: extremities normal, atraumatic, no cyanosis or edema  Skin: Skin color, texture, turgor normal. No rashes or lesions        Assessment:     Active Problems:    SOB (shortness of breath) (12/11/2020)      Fever (12/11/2020)    Four days postop C section    Plan:     I reviewed with [place] her medical records, physical exam, and review of symptoms. Obtain the following: chest CT to evaluate for PE, infiltrates, pulmonary edema; CBC, CMP, UA C&S, BNP, lactic acid, uric acid, LDH, and COVID-19 testing. Management d/w Dr. Coombs Artist (MFM) and Dr. Mai Gaviria (pt's PObP) who concur.     Signed By: Janee Vance MD     December 11, 2020

## 2020-12-11 NOTE — PROGRESS NOTES
Pt given ativan, welbutrin, and 1 of 2 potassium pills and proceeded to vomit within 2 minutes of taking them

## 2020-12-11 NOTE — H&P
History & Physical    Name: Skyla Jones MRN: [id number]  SSN: [id number]    YOB: 2000  Age: 23 y.o. Sex: female    Chief c/o: temp at home and difficulty with breathing when reclined  Subjective:     Reason for Admission:  CHF    History of Present Illness: Ms. Iona Hawkins is a 23 y.o.  female 4 days pp from csection for macrosomia. Patient complains of shortness of brath when laying down and fever of 101 this am. pt denies cough, sore throat, loss of smell. No excessive pain at incision site. Bottle feeding with some breast tenderness. Patient denies abdominal pain  , chest pain, headache , nausea and vomiting, right upper quadrant pain   and visual disturbances.     OB History    Para Term  AB Living   1 1 1 0 0 1   SAB TAB Ectopic Molar Multiple Live Births   0 0 0 0 0 1      # Outcome Date GA Lbr Jose Carlos/2nd Weight Sex Delivery Anes PTL Lv   1 Term 20 39w2d  4.71 kg M CS-LTranv Spinal N MARIELA     Past Medical History:   Diagnosis Date    Acute CHF (congestive heart failure) ([facility] [address] 2020    ADHD 2020    unmedicated    Depression with anxiety     Other ill-defined conditions(799.89) ENLARGED T/A    Psychiatric disorder ADHD     Past Surgical History:   Procedure Laterality Date    HX TONSILLECTOMY      HX WISDOM TEETH EXTRACTION       Social History     Occupational History    Not on file   Tobacco Use    Smoking status: Current Every Day Smoker     Packs/day: 0.25    Smokeless tobacco: Never Used   Substance and Sexual Activity    Alcohol use: No    Drug use: No    Sexual activity: Yes     Partners: Male     Birth control/protection: None      Family History   Problem Relation Age of Onset    Cancer Mother     No Known Problems Father     Malignant Hyperthermia Neg Hx     Pseudocholinesterase Deficiency Neg Hx     Delayed Awakening Neg Hx     Post-op Nausea/Vomiting Neg Hx     Emergence Delirium Neg Hx     Post-op Cognitive Dysfunction Neg Hx     Other Neg Hx        Allergies   Allergen Reactions    Penicillins Hives    Sulfamethoxazole-Trimethoprim Hives and Shortness of Breath     Prior to Admission medications    Medication Sig Start Date End Date Taking? Authorizing Provider   buPROPion SR [facility] SR) 150 mg SR tablet Take 1 Tab by mouth two (2) times a day. Indications: anxiousness associated with depression 20   Susie Jaimes MD   ibuprofen (MOTRIN) 800 mg tablet Take 1 Tab by mouth every eight (8) hours as needed for Pain. Indications: pain 20   Susie Jaimes MD   oxyCODONE IR (ROXICODONE) 5 mg immediate release tablet Take 1 Tab by mouth every six (6) hours as needed for Pain for up to 5 days. Max Daily Amount: 20 mg. Indications: pain 20  Susie Jaimes MD   sertraline (ZOLOFT) 100 mg tablet Take 1 Tab by mouth every evening. Indications: anxiousness associated with depression 20   Susie Jaimes MD   CTZ58-Otpl Fumarate-FA-DSS-DHA 26-1.2- mg cap Take  by mouth. Provider, Historical        Review of Systems:  A comprehensive review of systems was negative except for that written in the History of Present Illness. A 12 pint review of systems negative    Objective:     Vitals:    Vitals:    20 1051 20 1056 20 1101 20 1311   BP:  (!) 142/93     Pulse:  60     Temp:    99.6 °F (37.6 °C)   SpO2: (!) 81% 99% 92%    Weight:       Height:          Temp (24hrs), Av.6 °F (37.6 °C), Min:99.6 °F (37.6 °C), Max:99.6 °F (37.6 °C)    BP  Min: 134/84  Max: 150/97     Physical Exam:  Patient without distress.   Heart: Regular rate and rhythm  Breast - slightly engorged, no redness  Lung: clear to auscultation throughout lung fields, no wheezes, no rales, no rhonchi and normal respiratory effort  Back: costovertebral angle tenderness absent  Abdomen: soft, nontender  Incision healing well without redness  Lower Extremities:  - Edema 2+   - Patellar Reflexes: 2+ bilaterally  Psych- annoyed she has been waiting, otherwise, awake , alert         Lab/Data Review:  Recent Results (from the past 24 hour(s))   PROTEIN/CREATININE RATIO, URINE    Collection Time: 12/11/20  8:32 AM   Result Value Ref Range    Protein, urine random 129 mg/dL    Creatinine, urine 150.00 mg/dL    Protein/Creat. urine Ratio 0.9     URINALYSIS W/ RFLX MICROSCOPIC    Collection Time: 12/11/20  8:32 AM   Result Value Ref Range    Color NATHALIE      Appearance CLOUDY      Specific gravity 1.023 1.001 - 1.023      pH (UA) 6.5 5.0 - 9.0      Protein 100 (A) NEG mg/dL    Glucose Negative mg/dL    Ketone Negative NEG mg/dL    Bilirubin Negative NEG      Blood LARGE (A) NEG      Urobilinogen 1.0 0.2 - 1.0 EU/dL    Nitrites Negative NEG      Leukocyte Esterase MODERATE (A) NEG      WBC >100 (H) 0 /hpf    RBC >100 (H) 0 /hpf    Epithelial cells 0-3 0 /hpf    Bacteria TRACE 0 /hpf    Casts 5-10 0 /lpf   LD    Collection Time: 12/11/20  8:46 AM   Result Value Ref Range     (H) 100 - 190 U/L   NT-PRO BNP    Collection Time: 12/11/20  8:46 AM   Result Value Ref Range    NT pro-BNP 6,214 (H) 5 - 125 PG/ML   URIC ACID    Collection Time: 12/11/20  8:46 AM   Result Value Ref Range    Uric acid 4.6 2.6 - 6.0 MG/DL   METABOLIC PANEL, COMPREHENSIVE    Collection Time: 12/11/20  8:46 AM   Result Value Ref Range    Sodium 140 136 - 145 mmol/L    Potassium 3.4 (L) 3.5 - 5.1 mmol/L    Chloride 107 98 - 107 mmol/L    CO2 23 21 - 32 mmol/L    Anion gap 10 7 - 16 mmol/L    Glucose 83 65 - 100 mg/dL    BUN 6 6 - 23 MG/DL    Creatinine 0.47 (L) 0.6 - 1.0 MG/DL    GFR est AA >60 >60 ml/min/1.73m2    GFR est non-AA >60 >60 ml/min/1.73m2    Calcium 8.6 8.3 - 10.4 MG/DL    Bilirubin, total 0.5 0.2 - 1.1 MG/DL    ALT (SGPT) 158 (H) 12 - 65 U/L    AST (SGOT) 77 (H) 15 - 37 U/L    Alk.  phosphatase 163 (H) 50 - 130 U/L    Protein, total 6.2 (L) 6.3 - 8.2 g/dL    Albumin 2.7 (L) 3.5 - 5.0 g/dL    Globulin 3.5 2.3 - 3.5 g/dL    A-G Ratio 0.8 (L) 1.2 - 3.5     SARS-COV-2    Collection Time: 12/11/20  9:50 AM   Result Value Ref Range    Specimen source Nasopharyngeal      COVID-19 rapid test Not detected NOTD      SARS CoV-2 PENDING    CBC WITH AUTOMATED DIFF    Collection Time: 12/11/20 12:47 PM   Result Value Ref Range    WBC 16.0 (H) 4.3 - 11.1 K/uL    RBC 3.36 (L) 4.05 - 5.2 M/uL    HGB 9.4 (L) 11.7 - 15.4 g/dL    HCT 28.5 (L) 35.8 - 46.3 %    MCV 84.8 79.6 - 97.8 FL    MCH 28.0 26.1 - 32.9 PG    MCHC 33.0 31.4 - 35.0 g/dL    RDW 13.6 11.9 - 14.6 %    PLATELET 269 410 - 768 K/uL    MPV 11.2 9.4 - 12.3 FL    ABSOLUTE NRBC 0.07 0.0 - 0.2 K/uL    DF AUTOMATED      NEUTROPHILS 80 (H) 43 - 78 %    LYMPHOCYTES 9 (L) 13 - 44 %    MONOCYTES 9 4.0 - 12.0 %    EOSINOPHILS 1 0.5 - 7.8 %    BASOPHILS 0 0.0 - 2.0 %    IMMATURE GRANULOCYTES 0 0.0 - 5.0 %    ABS. NEUTROPHILS 12.8 (H) 1.7 - 8.2 K/UL    ABS. LYMPHOCYTES 1.5 0.5 - 4.6 K/UL    ABS. MONOCYTES 1.4 (H) 0.1 - 1.3 K/UL    ABS. EOSINOPHILS 0.1 0.0 - 0.8 K/UL    ABS. BASOPHILS 0.1 0.0 - 0.2 K/UL    ABS. IMM. GRANS. 0.1 0.0 - 0.5 K/UL   LACTIC ACID    Collection Time: 12/11/20 12:47 PM   Result Value Ref Range    Lactic acid 0.5 0.4 - 2.0 MMOL/L     Ct Chest W Cont    Result Date: 12/11/2020  IMPRESSION:  1. NO DEFINITE ACUTE PULMONARY EMBOLISM. 2.  SMALL BILATERAL PLEURAL EFFUSIONS WITH FINDINGS RATHER SUGGESTIVE OF MILD CONGESTIVE HEART FAILURE/FLUID IMBALANCE. Assessment and Plan:      Active Problems:    SOB (shortness of breath) (12/11/2020)      Fever (12/11/2020)      Acute CH[facility] (congestive [facility] failure) ([facility] (12/11/2020)      Postpartum edema (12/11/2020)       - Preeclampsia:  CHF- daily lasix and potassium  Follow closely

## 2020-12-11 NOTE — CONSULTS
Maternal Fetal Medicine Inpatient Consult Note    Chief Complaint:  Post Op SOB and Fever    History of Present Illness: 23 y.o.  at 39w2d gestation who presents with SOB, and fever. She is POD#5 from uncomplicated scheduled C/S for Macrosomia. Uncomplicated post op course. Talked to Dr. Juana Rose then Dr. Elder Swenson earlier during work up, recommended Lasix. She denies Fever and SOB now. Patient denies HA, abdominal pain, vision changes. No regular contractions, LOF, VB. Good FM. Minimal edema. No chest pain or shortness of breath now. She does not understand why she is in the hospital and wants to go home. Review of Systems:  A comprehensive review of systems was negative except for that written in the HPI.      History:  OB History    Para Term  AB Living   1 1 1 0 0 1   SAB TAB Ectopic Molar Multiple Live Births   0 0 0 0 0 1      # Outcome Date GA Lbr Jose Carlos/2nd Weight Sex Delivery Anes PTL Lv   1 Term 20 39w2d  10 lb 6.1 oz (4.71 kg) M CS-LTranv Spinal N MARIELA       Past Surgical History:   Procedure Laterality Date    HX TONSILLECTOMY      HX WISDOM TEETH EXTRACTION         Past Medical History:   Diagnosis Date    Acute CHF (congestive heart failure) ([facility] [address] 2020    ADHD 2020    unmedicated    Depression with anxiety     Other ill-defined conditions(799.89) ENLARGED T/A    Psychiatric disorder ADHD       Family History   Problem Relation Age of Onset    Cancer Mother     No Known Problems Father     Malignant Hyperthermia Neg Hx     Pseudocholinesterase Deficiency Neg Hx     Delayed Awakening Neg Hx     Post-op Nausea/Vomiting Neg Hx     Emergence Delirium Neg Hx     Post-op Cognitive Dysfunction Neg Hx     Other Neg Hx        Social History     Socioeconomic History    Marital status: SINGLE     Spouse name: Not on file    Number of children: Not on file    Years of education: Not on file    Highest education level: Not on file   Occupational History    Not on file   Social Needs    Financial resource strain: Not on file    Food insecurity     Worry: Not on file     Inability: Not on file    Transportation needs     Medical: Not on file     Non-medical: Not on file   Tobacco Use    Smoking status: Current Every Day Smoker     Packs/day: 0.25    Smokeless tobacco: Never Used   Substance and Sexual Activity    Alcohol use: No    Drug use: No    Sexual activity: Yes     Partners: Male     Birth control/protection: None   Lifestyle    Physical activity     Days per week: Not on file     Minutes per session: Not on file    Stress: Not on file   Relationships    Social connections     Talks on phone: Not on file     Gets together: Not on file     Attends Anabaptist service: Not on file     Active member of club or organization: Not on file     Attends meetings of clubs or organizations: Not on file     Relationship status: Not on file    Intimate partner violence     Fear of current or ex partner: Not on file     Emotionally abused: Not on file     Physically abused: Not on file     Forced sexual activity: Not on file   Other Topics Concern    Not on file   Social History Narrative    Not on file       Allergies   Allergen Reactions    Penicillins Hives    Sulfamethoxazole-Trimethoprim Hives and Shortness of Breath         Current Facility-Administered Medications:     sodium chloride (NS) flush 5-40 mL, 5-40 mL, IntraVENous, Q8H, Meliza Marx MD    sodium chloride (NS) flush 5-40 mL, 5-40 mL, IntraVENous, PRN, Meliza Marx MD    acetaminophen (TYLENOL) tablet 650 mg, 650 mg, Oral, Q6H PRN **OR** [DISCONTINUED] acetaminophen (TYLENOL) suppository 650 mg, 650 mg, Rectal, Q6H PRN, Meliza Marx MD    0.9% sodium chloride infusion, 75 mL/hr, IntraVENous, CONTINUOUS, Meliza Marx MD, Last Rate: 75 mL/hr at 12/11/20 1344, 75 mL/hr at 12/11/20 1344    ondansetron (ZOFRAN ODT) tablet 4 mg, 4 mg, Oral, Q8H PRN **OR** ondansetron (ZOFRAN) injection 4 mg, 4 mg, IntraVENous, Q6H PRN, Adrienne Marx MD    furosemide (LASIX) injection 40 mg, 40 mg, IntraVENous, DAILY, Wagner Marrero MD, 40 mg at 12/11/20 1341    sodium chloride (NS) flush 5-40 mL, 5-40 mL, IntraVENous, Q8H, Wagner Marrero MD    sodium chloride (NS) flush 5-40 mL, 5-40 mL, IntraVENous, PRN, Wagner Marrero MD    ibuprofen (MOTRIN) tablet 600 mg, 600 mg, Oral, Q6H PRN, Wagner Marrero MD    potassium chloride (K-DUR, KLOR-CON) SR tablet 40 mEq, 40 mEq, Oral, BID, Wagner Marrero MD, 40 mEq at 12/11/20 1405    buPROPion SR (WELLBUTRIN SR) tablet 150 mg, 150 mg, Oral, DAILY, Wagner Marrero MD, 150 mg at 12/11/20 1405    sertraline (ZOLOFT) tablet 100 mg, 100 mg, Oral, QPM, Wagner Marrero MD    LORazepam (ATIVAN) tablet 1 mg, 1 mg, Oral, Q4H PRN, Clotilde Harp MD, 1 mg at 12/11/20 1405    Objective:     Vitals:     Patient Vitals for the past 24 hrs:   Temp Pulse BP SpO2   12/11/20 1557 99 °F (37.2 °C) 100 (!) 144/102    12/11/20 1552  94 (!) 148/104    12/11/20 1311 99.6 °F (37.6 °C)      12/11/20 1101    92 %   12/11/20 1056  60 (!) 142/93 99 %   12/11/20 1051    (!) 81 %   12/11/20 1046  96 (!) 141/92    12/11/20 1041    (!) 87 %   12/11/20 1036  97 (!) 145/92    12/11/20 1032    96 %   12/11/20 1027  (!) 105 (!) 142/95 97 %   12/11/20 1022    97 %   12/11/20 1017    97 %   12/11/20 1016  94 (!) 144/93    12/11/20 1006  97 (!) 140/92 97 %   12/11/20 1001    97 %   12/11/20 0956  95 (!) 140/93 97 %   12/11/20 0953    (!) 85 %   12/11/20 0946  91 (!) 137/90 97 %   12/11/20 0941    97 %   12/11/20 0936   (!) 136/91 97 %   12/11/20 0932    90 %   12/11/20 0926   135/88 97 %   12/11/20 0921    97 %   12/11/20 0916  96 (!) 141/92 98 %   12/11/20 0911    96 %   12/11/20 0906  94 (!) 139/92 97 %   12/11/20 0901    97 %   12/11/20 0856  99 (!) 142/95 97 % 12/11/20 0851    97 %   12/11/20 0847  99 (!) 143/92    12/11/20 0845    97 %   12/11/20 0840    96 %   12/11/20 0835    97 %   12/11/20 0830    97 %   12/11/20 0826  (!) 102 (!) 143/95    12/11/20 0825    97 %   12/11/20 0816  (!) 105 (!) 141/97    12/11/20 0806  (!) 105 137/89    12/11/20 0804    95 %   12/11/20 0759    96 %   12/11/20 0756  (!) 103 134/84    12/11/20 0754    97 %   12/11/20 0751    92 %   12/11/20 0749    96 %   12/11/20 0746  (!) 103 134/86    12/11/20 0744    97 %   12/11/20 0743    91 %   12/11/20 0739    97 %   12/11/20 0736  (!) 105 (!) 150/97    12/11/20 0734    97 %   12/11/20 0729    97 %   12/11/20 0726  (!) 104 (!) 145/91    12/11/20 0724    97 %   12/11/20 0719    97 %   12/11/20 0713  (!) 107 (!) 144/95 97 %        I&O:   12/11 0701 - 12/11 1900  In: 1100 [P.O.:1100]  Out: 2375 [Urine:2375]             No intake/output data recorded. Output by Drain (mL) 12/09/20 0701 - 12/09/20 1900 12/09/20 1901 - 12/10/20 0700 12/10/20 0701 - 12/10/20 1900 12/10/20 1901 - 12/11/20 0700 12/11/20 0701 - 12/11/20 1700   Patient has no LDAs of requested type attached. Exam:   Constitutional: Patient without distress but appears angry and frustrated to be in hospital, does not want to be here. HEENT: Symmetric without facial palsy, uncorrected poor hearing or uncorrected poor vision.  No thyroid enlargement or goiter    Psychiatric: Mood appropriate, depressed        Labs:   CBC:    Recent Labs     12/11/20  1247 12/08/20  1159 12/08/20  0610 12/07/20  0721 09/18/20  0958 04/30/20  1625 04/30/20   WBC 16.0* 12.9*  --  10.9  --  9.5  --    HGB 9.4* 8.1* 7.4* 10.1* 11.1 12.6  --    HCT 28.5* 24.3* 23.4* 31.2*  --  37.1  --     218  --  242  --  261  --    HGBEXT  --   --   --   --   --   --  12.6   HCTEXT  --   --   --   --   --   --  37.1   PLTEXT  --   --   --   --   --   --  261       CMP:   Recent Labs 20  0846      K 3.4*      CO2 23   AGAP 10   GLU 83   BUN 6   CREA 0.47*   GFRAA >60   GFRNA >60   CA 8.6   ALB 2.7*   TP 6.2*   GLOB 3.5   AGRAT 0.8*   *       Recent Labs     20  0846   URICA 4.6   *         Recent Glucose Results: Recent Glucose Results:   Recent Labs     20  0846 20  1527 20  1009 20  1008   GLU 83  --   --   --    GLUCPOC  --  119* 71 60*       Prenatal Labs:    Lab Results   Component Value Date/Time    Rubella, External immune 2020    HBsAg, External negative 2020    HIV, External NR 2020    RPR, External NR 2020       Imaging:     CT Results  (Last 48 hours)               20 1125  CT CHEST W CONT Final result    Impression:  IMPRESSION:         1. NO DEFINITE ACUTE PULMONARY EMBOLISM. 2.  SMALL BILATERAL PLEURAL EFFUSIONS WITH FINDINGS RATHER SUGGESTIVE OF MILD   CONGESTIVE HEART FAILURE/FLUID IMBALANCE. Narrative:  CHEST CT ANGIOGRAPHY WITH ADDITIONAL REFORMATS:         CLINICAL HISTORY:  Shortness of breath and chest pain for days after    section. Now with elevated BNP. TECHNIQUE:  During bolus injection of nonionic intravenous contrast, the chest   was scanned with spiral technique, and coronal reformats were produced. Radiation dose reduction was achieved using one or all of the following   techniques: automated exposure control, weight-based dosing, iterative   reconstruction. COMPARISON:  None. FINDINGS:  There is expected opacification of the pulmonary arterial tree with   no intraluminal soft tissue density to suggest acute pulmonary embolism. There   is no definite pneumothorax. No pathologically enlarged lymph nodes. There is   mild cardiomegaly with left atrial enlargement, pulmonary venous congestion,   edema, and small pleural effusions in a pattern most consistent with congestive   heart failure or fluid imbalance.   The epigastrium appears unremarkable as   imaged. Assessment and Plan:  23 y.o.  at 39w2d with     Active Problems:    Fever (2020)      Acute CHF (congestive heart failure) ([address] (2020)      Postpartum edema (2020)      SOB (shortness of breath) (2020)    Patient presented with main complaint of N/V, SOB. Fever at home but none since admission. CT c/w fluid overload and mild Left sided hear failure. No sign of  Pulmonary Embolus. Will treat with aggressive diuresis. Did not tolerate po K+, will see what K+ is in am. Patient wants to go home. I stressed importance of her staying for treatment. Will repeat labs in am.      Time: 110    Minutes spent on floor,with greater than 50% of the time examining patient, explaining plan and coordinating care with nurse and requesting primary physician.

## 2020-12-11 NOTE — PROGRESS NOTES
Patient presents to triage went to ER first and was told that she needed to come up here. Patient delivered by primary  on . Presents today with complaints of fever, short of breath and her incision is burning. Patient states that she has had these symptoms since she got home on . She is now stating that she is having some burning with urination and some dry heaving.

## 2020-12-12 LAB
ALBUMIN SERPL-MCNC: 2.6 G/DL (ref 3.5–5)
ALBUMIN/GLOB SERPL: 0.7 {RATIO} (ref 1.2–3.5)
ALP SERPL-CCNC: 153 U/L (ref 50–136)
ALT SERPL-CCNC: 138 U/L (ref 12–65)
ANION GAP SERPL CALC-SCNC: 11 MMOL/L (ref 7–16)
AST SERPL-CCNC: 49 U/L (ref 15–37)
BASOPHILS # BLD: 0 K/UL (ref 0–0.2)
BASOPHILS # BLD: 0.1 K/UL (ref 0–0.2)
BASOPHILS NFR BLD: 0 % (ref 0–2)
BASOPHILS NFR BLD: 0 % (ref 0–2)
BILIRUB SERPL-MCNC: 0.5 MG/DL (ref 0.2–1.1)
BUN SERPL-MCNC: 9 MG/DL (ref 6–23)
CALCIUM SERPL-MCNC: 8.3 MG/DL (ref 8.3–10.4)
CHLORIDE SERPL-SCNC: 106 MMOL/L (ref 98–107)
CO2 SERPL-SCNC: 23 MMOL/L (ref 21–32)
CREAT SERPL-MCNC: 0.44 MG/DL (ref 0.6–1)
DIFFERENTIAL METHOD BLD: ABNORMAL
DIFFERENTIAL METHOD BLD: ABNORMAL
EOSINOPHIL # BLD: 0.1 K/UL (ref 0–0.8)
EOSINOPHIL # BLD: 0.2 K/UL (ref 0–0.8)
EOSINOPHIL NFR BLD: 1 % (ref 0.5–7.8)
EOSINOPHIL NFR BLD: 1 % (ref 0.5–7.8)
ERYTHROCYTE [DISTWIDTH] IN BLOOD BY AUTOMATED COUNT: 13.5 % (ref 11.9–14.6)
ERYTHROCYTE [DISTWIDTH] IN BLOOD BY AUTOMATED COUNT: 13.5 % (ref 11.9–14.6)
GLOBULIN SER CALC-MCNC: 3.6 G/DL (ref 2.3–3.5)
GLUCOSE SERPL-MCNC: 82 MG/DL (ref 65–100)
HCT VFR BLD AUTO: 28.8 % (ref 35.8–46.3)
HCT VFR BLD AUTO: 33.1 % (ref 35.8–46.3)
HGB BLD-MCNC: 10.9 G/DL (ref 11.7–15.4)
HGB BLD-MCNC: 9.4 G/DL (ref 11.7–15.4)
IMM GRANULOCYTES # BLD AUTO: 0.1 K/UL (ref 0–0.5)
IMM GRANULOCYTES # BLD AUTO: 0.1 K/UL (ref 0–0.5)
IMM GRANULOCYTES NFR BLD AUTO: 1 % (ref 0–5)
IMM GRANULOCYTES NFR BLD AUTO: 1 % (ref 0–5)
LACTATE SERPL-SCNC: 0.5 MMOL/L (ref 0.4–2)
LYMPHOCYTES # BLD: 1.6 K/UL (ref 0.5–4.6)
LYMPHOCYTES # BLD: 1.7 K/UL (ref 0.5–4.6)
LYMPHOCYTES NFR BLD: 11 % (ref 13–44)
LYMPHOCYTES NFR BLD: 9 % (ref 13–44)
MCH RBC QN AUTO: 27.8 PG (ref 26.1–32.9)
MCH RBC QN AUTO: 27.8 PG (ref 26.1–32.9)
MCHC RBC AUTO-ENTMCNC: 32.6 G/DL (ref 31.4–35)
MCHC RBC AUTO-ENTMCNC: 32.9 G/DL (ref 31.4–35)
MCV RBC AUTO: 84.4 FL (ref 79.6–97.8)
MCV RBC AUTO: 85.2 FL (ref 79.6–97.8)
MONOCYTES # BLD: 1.5 K/UL (ref 0.1–1.3)
MONOCYTES # BLD: 1.9 K/UL (ref 0.1–1.3)
MONOCYTES NFR BLD: 10 % (ref 4–12)
MONOCYTES NFR BLD: 9 % (ref 4–12)
NEUTS SEG # BLD: 11.8 K/UL (ref 1.7–8.2)
NEUTS SEG # BLD: 16.2 K/UL (ref 1.7–8.2)
NEUTS SEG NFR BLD: 78 % (ref 43–78)
NEUTS SEG NFR BLD: 80 % (ref 43–78)
NRBC # BLD: 0.03 K/UL (ref 0–0.2)
NRBC # BLD: 0.08 K/UL (ref 0–0.2)
PLATELET # BLD AUTO: 430 K/UL (ref 150–450)
PLATELET # BLD AUTO: 504 K/UL (ref 150–450)
PMV BLD AUTO: 11.2 FL (ref 9.4–12.3)
PMV BLD AUTO: 11.2 FL (ref 9.4–12.3)
POTASSIUM SERPL-SCNC: 3.3 MMOL/L (ref 3.5–5.1)
PROT SERPL-MCNC: 6.2 G/DL (ref 6.3–8.2)
RBC # BLD AUTO: 3.38 M/UL (ref 4.05–5.2)
RBC # BLD AUTO: 3.92 M/UL (ref 4.05–5.2)
SODIUM SERPL-SCNC: 140 MMOL/L (ref 136–145)
WBC # BLD AUTO: 15.1 K/UL (ref 4.3–11.1)
WBC # BLD AUTO: 20.1 K/UL (ref 4.3–11.1)

## 2020-12-12 PROCEDURE — 65270000029 HC RM PRIVATE

## 2020-12-12 PROCEDURE — 74011250637 HC RX REV CODE- 250/637: Performed by: OBSTETRICS & GYNECOLOGY

## 2020-12-12 PROCEDURE — 74011250636 HC RX REV CODE- 250/636: Performed by: OBSTETRICS & GYNECOLOGY

## 2020-12-12 PROCEDURE — 85025 COMPLETE CBC W/AUTO DIFF WBC: CPT

## 2020-12-12 PROCEDURE — 36415 COLL VENOUS BLD VENIPUNCTURE: CPT

## 2020-12-12 PROCEDURE — 80053 COMPREHEN METABOLIC PANEL: CPT

## 2020-12-12 PROCEDURE — 83605 ASSAY OF LACTIC ACID: CPT

## 2020-12-12 RX ORDER — FUROSEMIDE 20 MG/1
40 TABLET ORAL
Status: COMPLETED | OUTPATIENT
Start: 2020-12-12 | End: 2020-12-12

## 2020-12-12 RX ORDER — FUROSEMIDE 10 MG/ML
20 INJECTION INTRAMUSCULAR; INTRAVENOUS ONCE
Status: COMPLETED | OUTPATIENT
Start: 2020-12-12 | End: 2020-12-12

## 2020-12-12 RX ORDER — HYDRALAZINE HYDROCHLORIDE 25 MG/1
50 TABLET, FILM COATED ORAL EVERY 6 HOURS
Status: DISCONTINUED | OUTPATIENT
Start: 2020-12-12 | End: 2020-12-13 | Stop reason: HOSPADM

## 2020-12-12 RX ADMIN — SERTRALINE HYDROCHLORIDE 100 MG: 50 TABLET ORAL at 18:20

## 2020-12-12 RX ADMIN — IBUPROFEN 600 MG: 600 TABLET, FILM COATED ORAL at 14:45

## 2020-12-12 RX ADMIN — LORAZEPAM 1 MG: 1 TABLET ORAL at 06:36

## 2020-12-12 RX ADMIN — BUPROPION HYDROCHLORIDE 150 MG: 150 TABLET, EXTENDED RELEASE ORAL at 08:17

## 2020-12-12 RX ADMIN — ACETAMINOPHEN 650 MG: 325 TABLET, FILM COATED ORAL at 19:00

## 2020-12-12 RX ADMIN — LORAZEPAM 1 MG: 1 TABLET ORAL at 11:03

## 2020-12-12 RX ADMIN — HYDRALAZINE HYDROCHLORIDE 50 MG: 25 TABLET, FILM COATED ORAL at 06:36

## 2020-12-12 RX ADMIN — HYDRALAZINE HYDROCHLORIDE 50 MG: 25 TABLET, FILM COATED ORAL at 12:15

## 2020-12-12 RX ADMIN — LORAZEPAM 1 MG: 1 TABLET ORAL at 21:41

## 2020-12-12 RX ADMIN — HYDRALAZINE HYDROCHLORIDE 50 MG: 25 TABLET, FILM COATED ORAL at 23:56

## 2020-12-12 RX ADMIN — POTASSIUM CHLORIDE 40 MEQ: 1500 TABLET, EXTENDED RELEASE ORAL at 08:17

## 2020-12-12 RX ADMIN — HYDRALAZINE HYDROCHLORIDE 50 MG: 25 TABLET, FILM COATED ORAL at 18:20

## 2020-12-12 RX ADMIN — FUROSEMIDE 20 MG: 10 INJECTION, SOLUTION INTRAMUSCULAR; INTRAVENOUS at 06:37

## 2020-12-12 RX ADMIN — FUROSEMIDE 40 MG: 20 TABLET ORAL at 19:00

## 2020-12-12 NOTE — PROGRESS NOTES
08:17  Medication Given  potassium chloride (K-DUR, KLOR-CON) SR tablet 40 mEq -  Dose: 40 mEq ; Route: Oral ; Scheduled Time: 0900  Kathy Suh RN    08:17  Medication Given  buPROPion SR (WELLBUTRIN SR) tablet 150 mg -  Dose: 150 mg ; Route: Oral ; Scheduled Time: 0900

## 2020-12-12 NOTE — PROGRESS NOTES
Dr. Allie Shell notified pts BP is 142/102. HR in 110-115. Temp 99.6. Orders received for Lasix 20mg and Hydralazine 50mg. Pt continues to say she wants to go home and her anxiety/depression is increased in hospital because she feels \"locked in\". POC explained to pt. Explained to her that it benefits her to stay here and make sure she is okay so she can go home healthy to her baby. Pt tearful but nods and agrees.

## 2020-12-12 NOTE — PROGRESS NOTES
12/12/20 0811   Maternal Vital Signs   Temp 99.4 °F (37.4 °C)   Temp Source Oral   Pulse (Heart Rate) (!) 125   Resp Rate 20   Level of Consciousness Alert   /87   MAP (Calculated) 103   BP 1 Method Automatic   BP 1 Location Left arm   BP Patient Position Head of bed elevated (Comment degrees)

## 2020-12-12 NOTE — PROGRESS NOTES
[facility] Progress Note    Called by Nurse to review labs, concern of elevated BP and patient anxiety. Chart reviewed. Orders for Hydralazine and Lasix entered to continue diuresis and decrease BP and afterload. Continue to treat inpatient.

## 2020-12-13 VITALS
DIASTOLIC BLOOD PRESSURE: 83 MMHG | HEIGHT: 63 IN | RESPIRATION RATE: 18 BRPM | HEART RATE: 105 BPM | BODY MASS INDEX: 34.8 KG/M2 | SYSTOLIC BLOOD PRESSURE: 129 MMHG | WEIGHT: 196.4 LBS | OXYGEN SATURATION: 98 % | TEMPERATURE: 99.2 F

## 2020-12-13 LAB
ALBUMIN SERPL-MCNC: 2.6 G/DL (ref 3.5–5)
ALBUMIN/GLOB SERPL: 0.7 {RATIO} (ref 1.2–3.5)
ALP SERPL-CCNC: 150 U/L (ref 50–130)
ALT SERPL-CCNC: 93 U/L (ref 12–65)
ANION GAP SERPL CALC-SCNC: 9 MMOL/L (ref 7–16)
AST SERPL-CCNC: 21 U/L (ref 15–37)
BACTERIA SPEC CULT: NORMAL
BILIRUB SERPL-MCNC: 0.5 MG/DL (ref 0.2–1.1)
BUN SERPL-MCNC: 9 MG/DL (ref 6–23)
CALCIUM SERPL-MCNC: 8.5 MG/DL (ref 8.3–10.4)
CHLORIDE SERPL-SCNC: 103 MMOL/L (ref 98–107)
CO2 SERPL-SCNC: 25 MMOL/L (ref 21–32)
COVID-19 RAPID TEST, COVR: NOT DETECTED
CREAT SERPL-MCNC: 0.45 MG/DL (ref 0.6–1)
ERYTHROCYTE [DISTWIDTH] IN BLOOD BY AUTOMATED COUNT: 13.6 % (ref 11.9–14.6)
GLOBULIN SER CALC-MCNC: 3.7 G/DL (ref 2.3–3.5)
GLUCOSE SERPL-MCNC: 90 MG/DL (ref 65–100)
HCT VFR BLD AUTO: 31.9 % (ref 35.8–46.3)
HGB BLD-MCNC: 10.5 G/DL (ref 11.7–15.4)
MCH RBC QN AUTO: 27.6 PG (ref 26.1–32.9)
MCHC RBC AUTO-ENTMCNC: 32.9 G/DL (ref 31.4–35)
MCV RBC AUTO: 83.9 FL (ref 79.6–97.8)
NRBC # BLD: 0 K/UL (ref 0–0.2)
PLATELET # BLD AUTO: 501 K/UL (ref 150–450)
PMV BLD AUTO: 10.6 FL (ref 9.4–12.3)
POTASSIUM SERPL-SCNC: 3.3 MMOL/L (ref 3.5–5.1)
PROT SERPL-MCNC: 6.3 G/DL (ref 6.3–8.2)
RBC # BLD AUTO: 3.8 M/UL (ref 4.05–5.2)
SARS COV-2, XPGCVT: NEGATIVE
SERVICE CMNT-IMP: NORMAL
SODIUM SERPL-SCNC: 137 MMOL/L (ref 136–145)
SOURCE, COVRS: NORMAL
WBC # BLD AUTO: 15.8 K/UL (ref 4.3–11.1)

## 2020-12-13 PROCEDURE — 36415 COLL VENOUS BLD VENIPUNCTURE: CPT

## 2020-12-13 PROCEDURE — 74011250637 HC RX REV CODE- 250/637: Performed by: OBSTETRICS & GYNECOLOGY

## 2020-12-13 PROCEDURE — 80053 COMPREHEN METABOLIC PANEL: CPT

## 2020-12-13 PROCEDURE — 85027 COMPLETE CBC AUTOMATED: CPT

## 2020-12-13 RX ORDER — HYDRALAZINE HYDROCHLORIDE 50 MG/1
50 TABLET, FILM COATED ORAL EVERY 6 HOURS
Qty: 30 TAB | Refills: 0 | Status: SHIPPED | OUTPATIENT
Start: 2020-12-13

## 2020-12-13 RX ORDER — HYDROCHLOROTHIAZIDE 50 MG/1
50 TABLET ORAL DAILY
Qty: 7 TAB | Refills: 0 | Status: SHIPPED | OUTPATIENT
Start: 2020-12-14

## 2020-12-13 RX ORDER — HYDROCHLOROTHIAZIDE 25 MG/1
50 TABLET ORAL DAILY
Status: DISCONTINUED | OUTPATIENT
Start: 2020-12-13 | End: 2020-12-13 | Stop reason: HOSPADM

## 2020-12-13 RX ADMIN — LORAZEPAM 1 MG: 1 TABLET ORAL at 09:39

## 2020-12-13 RX ADMIN — BUPROPION HYDROCHLORIDE 150 MG: 150 TABLET, EXTENDED RELEASE ORAL at 09:29

## 2020-12-13 RX ADMIN — HYDRALAZINE HYDROCHLORIDE 50 MG: 25 TABLET, FILM COATED ORAL at 06:07

## 2020-12-13 RX ADMIN — ACETAMINOPHEN 650 MG: 325 TABLET, FILM COATED ORAL at 02:33

## 2020-12-13 RX ADMIN — HYDROCHLOROTHIAZIDE 50 MG: 25 TABLET ORAL at 09:29

## 2020-12-13 RX ADMIN — HYDRALAZINE HYDROCHLORIDE 50 MG: 25 TABLET, FILM COATED ORAL at 12:50

## 2020-12-13 RX ADMIN — ONDANSETRON 4 MG: 4 TABLET, ORALLY DISINTEGRATING ORAL at 02:43

## 2020-12-13 NOTE — PROGRESS NOTES
Ante Partum High Risk Pregnancy Note    Patient admitted for CHF and PE on CT had low grade fever on arrival thought to be from engorgement states she does not  have  ahortness of breath or fever symptoms. Added hctz for 7 days and  Continue BP meds for 4 weeks- see Rommel note  BP improved   D/c Aniceto ok to d/c on home meds  LOS:    Vitals: Temp (24hrs), Av.2 °F (36.8 °C), Min:97.6 °F (36.4 °C), Max:99.2 °F (37.3 °C)     Patient Vitals for the past 24 hrs:   BP   20 0928 129/83   20 0607 (!) 137/95   20 0354 126/83   20 0232 (!) 140/99   20 2356 (!) 134/92   20 2140 132/86   20 1821 (!) 137/94   20 1502 (!) 139/93   20 1430 (!) 137/100   20 1215 (!) 142/97       I&O:    0701 -  190  In: -   Out: 300 [Urine:300]             1901 -  0700  In: 2965 [P.O.:2965]  Out: 6696 [Urine:3850]    Exam:  Patient without distress.                Abdomen: soft, non-tender               Fundus: soft and non tender                                       Lower Extremities: No                 Patient Vitals for the past 24 hrs:   Temp Pulse Resp BP SpO2   20 0928 99.2 °F (37.3 °C) (!) 105 18 129/83 98 %   20 0607  (!) 107 18 (!) 137/95    20 0354    126/83    20 0232 98.2 °F (36.8 °C) (!) 122 18 (!) 140/99    20 2356 97.6 °F (36.4 °C) (!) 101 18 (!) 134/92    20 2140 97.7 °F (36.5 °C) (!) 105 18 132/86    20 1821  (!) 117  (!) 137/94    20 1502  (!) 117  (!) 139/93    20 1430  (!) 131  (!) 137/100               Lab/Data Review:  CMP:   Lab Results   Component Value Date/Time     2020 05:25 AM    K 3.3 (L) 2020 05:25 AM     2020 05:25 AM    CO2 25 2020 05:25 AM    AGAP 9 2020 05:25 AM    GLU 90 2020 05:25 AM    BUN 9 2020 05:25 AM    CREA 0.45 (L) 2020 05:25 AM    GFRAA >60 2020 05:25 AM    GFRNA >60 2020 05:25 AM    CA 8.5 12/13/2020 05:25 AM    ALB 2.6 (L) 12/13/2020 05:25 AM    TP 6.3 12/13/2020 05:25 AM    GLOB 3.7 (H) 12/13/2020 05:25 AM    AGRAT 0.7 (L) 12/13/2020 05:25 AM    ALT 93 (H) 12/13/2020 05:25 AM     CBC:   Lab Results   Component Value Date/Time    WBC 15.8 (H) 12/13/2020 05:25 AM    HGB 10.5 (L) 12/13/2020 05:25 AM    HCT 31.9 (L) 12/13/2020 05:25 AM     (H) 12/13/2020 05:25 AM       Assessment and Plan:      Principal Problem:    Acute CHF (congestive heart failure) ([address] (12/11/2020)      Overview: 12/11/2020 [facility] Consult-    Active Problems:    SOB (shortness of breath) (12/11/2020)      Fever (12/11/2020)      Postpartum edema (12/11/2020)          CHF   Pulmonary Edema  PT much improved without symptoms

## 2020-12-13 NOTE — PROGRESS NOTES
[facility] Progress Note  Chart reviewed. Have been getting updates from nurse and Dr. Gladis Smith for the last 48 hours. Afebrile entire admission. BP well controlled on Hydralazine. Good diuresis. I think needs continue slow diuresis for a week and BP meds for at least 4 weeks. Can go home today with follow up at [facility] office in 3-4 days. D/C Home to minimal activity. HCTZ 25 mgs every am for 7 days. Hydralazine 50 mgs every 6 hours until HTN resolved. F/U with OB in 3-4 days for BP check. At high risk of PP Depression, refer to counselor.

## 2020-12-13 NOTE — DISCHARGE SUMMARY
Obstetrical Discharge Summary     Name: Americo Sánchez MRN: [id number]  SSN: [id number]    YOB: 2000  Age: 23 y.o. Sex: female      Allergies: Penicillins and Sulfamethoxazole-trimethoprim    Admit Date: 2020    Discharge Date: 2020     Admitting Physician: Adal Yin MD     Attending Physician:  John Reece, *     * Admission Diagnoses: SOB (shortness of breath) [R06.02]; Fever [R50.9]; Postpartum edema [O12.05]; Acute CHF (congestive heart failure) ([facility] [address] [I50.9]    * Discharge Diagnoses:   Information for the patient's :  Rahul Ness [[id number]]   Delivery of a 10 lb 6.1 oz (4.71 kg) male infant via , Low Transverse on 2020 at 9:45 AM  by Herb Perry. Apgars were 8  and 9 .        Additional Diagnoses:   Hospital Problems as of 2020 Date Reviewed: 2020          Codes Class Noted - Resolved POA    SOB (shortness of breath) ICD-10-CM: R06.02  ICD-9-CM: 786.05  2020 - Present Yes        Fever ICD-10-CM: R50.9  ICD-9-CM: 780.60  2020 - Present Yes        * (Principal) Acute CHF (congestive heart failure) ([facility] ICD-10-CM: I50.9  ICD-9-CM: 428.0  2020 - Present Yes    Overview Signed 2020  5:17 PM by Beatriz Park MD     2020 [facility] Consult-             Postpartum edema ICD-10-CM: O12.05  ICD-9-CM: 646.14  2020 - Present Yes             Lab Results   Component Value Date/Time    ABO/Rh(D) A POSITIVE 2020 07:21 AM    Rubella, External immune 2020    ABO,Rh A positive 2020      Immunization History   Administered Date(s) Administered    Influenza Vaccine (Quad) PF (>6 Mo Flulaval, Fluarix, and >3 Yrs Afluria, Fluzone 61011) 2020    Tdap 2020       * Procedures: Diuresis  Antihypertensives, LAbs  * No surgery found *           * Discharge Condition: improved    * Hospital Course: Postpartum course was complicated by CHF, , which added 3 days to the patient's length of stay. * Disposition: Home    Discharge Medications:   Current Discharge Medication List      START taking these medications    Details   hydrALAZINE (APRESOLINE) 50 mg tablet Take 1 Tab by mouth every six (6) hours. Qty: 30 Tab, Refills: 0      hydroCHLOROthiazide (HYDRODIURIL) 50 mg tablet Take 1 Tab by mouth daily. Qty: 7 Tab, Refills: 0         CONTINUE these medications which have NOT CHANGED    Details   buPROPion SR (WELLBUTRIN SR) 150 mg SR tablet Take 1 Tab by mouth two (2) times a day. Indications: anxiousness associated with depression  Qty: 60 Tab, Refills: 12      ibuprofen (MOTRIN) 800 mg tablet Take 1 Tab by mouth every eight (8) hours as needed for Pain. Indications: pain  Qty: 40 Tab, Refills: 1      oxyCODONE IR (ROXICODONE) 5 mg immediate release tablet Take 1 Tab by mouth every six (6) hours as needed for Pain for up to 5 days. Max Daily Amount: 20 mg. Indications: pain  Qty: 20 Tab, Refills: 0    Associated Diagnoses: Macrosomia      sertraline (ZOLOFT) 100 mg tablet Take 1 Tab by mouth every evening. Indications: anxiousness associated with depression  Qty: 30 Tab, Refills: 12      PNV66-Iron Fumarate-FA-DSS-DHA 26-1.2- mg cap Take  by mouth. * Follow-up Care/Patient Instructions:   Activity: Activity as tolerated  Diet: Regular Diet      Follow-up Information     Follow up With Special[facility] [address], Bruna Thomas MD    [address] [address]  [phone]

## 2020-12-13 NOTE — PROGRESS NOTES
09:29  Medication Given  buPROPion SR (WELLBUTRIN SR) tablet 150 mg -  Dose: 150 mg ; Route: Oral ; Scheduled Time: 0900  Lashell Mueller RN    09:29  Medication Given  hydroCHLOROthiazide (HYDRODIURIL) tablet 50 mg -  Dose: 50 mg ; Route: Oral ; Scheduled Time: 0900  Lashell Mueller RN    09:31  Medication Refused  potassium chloride (K-DUR, KLOR-CON) SR tablet 40 mEq -  Dose: 40 mEq ; Route: Oral ; Scheduled Time: 0900  Lashell Mueller RN

## 2020-12-13 NOTE — DISCHARGE INSTRUCTIONS
Patient Education          Learning About Heart Failure  What is heart failure? Heart failure means that your heart muscle doesn't pump as much blood as your body needs. Failure doesn't mean that your heart has stopped. It means that your heart isn't pumping as well as it should. Because your heart cannot pump well, your body tries to make up for it. To do this:  · Your body holds on to salt and water. This increases the amount of blood in your bloodstream.  · Your heart beats faster. · Your heart might get bigger. Your body has an amazing ability to make up for heart failure. It may do such a good job that you don't know you have a disease. But at some point, your heart and body will no longer be able to keep up. Then fluid starts to build up in your lungs and other parts of your body. This fluid buildup is called congestion. It's why some doctors call the disease congestive heart failure. What can you expect when you have heart failure? Heart failure is a lifelong (chronic) disease. Treatment may be able to slow the disease and help you feel better. But heart failure tends to get worse over time. Despite this, there are many steps you can take to feel better and stay healthy longer. Early on, your symptoms may not be too bad. As heart failure gets worse, symptoms typically get worse, and you may need to limit your activities. Heart failure can also get worse suddenly. If this happens, you need emergency care. Then, after treatment, your symptoms may go back to being stable (which means they stay the same) for a long time. Heart failure can lead to other health problems, such as heart rhythm problems. Over time, your treatment options may change, especially as your symptoms get worse. You may want to think about what kind of care you want at the end of your life. What are the symptoms? Symptoms of heart failure start to happen when your heart can't pump enough blood to the rest of your body.   In the early stages of heart failure, you may:  · Feel tired easily. · Be short of breath when you exert yourself. · Feel like your heart is pounding or racing (palpitations). · Feel weak or dizzy. As heart failure gets worse, fluid starts to build up in your lungs and other parts of your body. This may cause you to:  · Feel short of breath even at rest.  · Have swelling (edema), especially in your legs, ankles, and feet. · Gain weight. This may happen over just a day or two, or more slowly. · Cough or wheeze, especially when you lie down. · Feel bloated or sick to your stomach. How is heart failure treated? Heart failure is treated with medicines and steps you take to make lifestyle changes and check your symptoms. Treatment can slow the disease and help you feel better and live longer. · You'll probably take several medicines. · You'll take steps to care for yourself at home. Roxi Quintero watch for changes in your symptoms. You may need to make lifestyle changes, such as limiting sodium, getting regular exercise, not smoking, and eating healthy foods. · You might attend cardiac rehabilitation (rehab) to get education and support that help you make lifestyle changes and stay as healthy as possible. · You may get a heart device. A pacemaker helps your heart pump blood. An ICD can stop abnormal heart rhythms. · As heart failure gets worse, palliative care can help improve your quality of life. You can do advance care planning to decide what kind of care you want at the end of your life. How can you care for yourself? There are many steps you can take to feel better and live longer. These steps are an important part of treatment. They can help you stay active and enjoy life. Take your medicine the right way. Avoid medicines that can make your symptoms worse. Check your weight and symptoms every day. Know what to do if your symptoms get worse. Limit sodium. This helps keep fluid from building up.  It may help you feel better. Be active. Exercise regularly, but don't exercise too hard. Be heart-healthy. Eat healthy foods, stay at a healthy weight, limit alcohol, and don't smoke. Stay as healthy as possible. Manage other health problems such as diabetes and high blood pressure. Avoid colds and flu, get help for depression and anxiety, and manage stress. If you think you may have a problem with alcohol or drug use, talk to your doctor. Follow-up care is a key part of your treatment and safety. Be sure to make and go to all appointments, and call your doctor if you are having problems. It's also a good idea to know your test results and keep a list of the medicines you take. Where can you learn more? Go to http://www.gray.com/  Enter [id number] in the search box to learn more about \"Learning About Heart Failure. \"  Current as of: December 16, 2019               Content Version: 12.6  © 9875-6622 [employer]. Care instructions adapted under license by [employer] (which disclaims liability or warranty for this information). If you have questions about a medical condition or this instruction, always ask your healthcare professional. [address] any warranty or liability for your use of this information.

## 2020-12-21 PROBLEM — O99.332 TOBACCO SMOKING AFFECTING PREGNANCY IN SECOND TRIMESTER: Status: RESOLVED | Noted: 2020-05-05 | Resolved: 2020-12-21

## 2020-12-21 PROBLEM — Z3A.39 39 WEEKS GESTATION OF PREGNANCY: Status: RESOLVED | Noted: 2020-12-07 | Resolved: 2020-12-21

## 2020-12-21 PROBLEM — O09.92 HIGH-RISK PREGNANCY IN SECOND TRIMESTER: Status: RESOLVED | Noted: 2020-05-05 | Resolved: 2020-12-21

## 2020-12-21 PROBLEM — F32.A DEPRESSION AFFECTING PREGNANCY: Status: RESOLVED | Noted: 2020-05-05 | Resolved: 2020-12-21

## 2020-12-21 PROBLEM — O99.340 DEPRESSION AFFECTING PREGNANCY: Status: RESOLVED | Noted: 2020-05-05 | Resolved: 2020-12-21

## 2020-12-21 PROBLEM — R06.02 SOB (SHORTNESS OF BREATH): Status: RESOLVED | Noted: 2020-12-11 | Resolved: 2020-12-21

## 2020-12-28 ENCOUNTER — TELEPHONE (OUTPATIENT)
Dept: CASE MANAGEMENT | Age: 20
End: 2020-12-28

## 2020-12-28 NOTE — TELEPHONE ENCOUNTER
Phone call to patient at [phone] due to EPDS score 11. Patient states that she and baby Juan Powers are doing well. Per patient, she continues to take her Zoloft regularly and feels that it has been helpful. Patient denies any ongoing depression/anxiety postpartum. Patient denied any needs from  at this time.   Patient agreeable for  to call back in the future to check in.    Pratibha Mcbride 20

## 2021-01-12 PROBLEM — O35.2XX0 HEREDITARY DISEASE IN FAMILY POSSIBLY AFFECTING FETUS: Status: RESOLVED | Noted: 2020-05-05 | Resolved: 2021-01-12

## 2021-02-03 ENCOUNTER — TELEPHONE (OUTPATIENT)
Dept: CASE MANAGEMENT | Age: 21
End: 2021-02-03

## 2021-02-03 NOTE — TELEPHONE ENCOUNTER
Patient states that she and baby Nolvia Trejo are doing well. Per patient, she continues to take her Zoloft and Wellbutrin regularly. Patient denies any ongoing depression/anxiety postpartum.     Patient denied any needs from  at this time.   Patient agreeable for  to call back in the future to check in.  [address] [address]

## 2021-03-16 ENCOUNTER — TELEPHONE (OUTPATIENT)
Dept: CASE MANAGEMENT | Age: 21
End: 2021-03-16

## 2021-03-16 NOTE — TELEPHONE ENCOUNTER
Phone call to patient at [phone] due to EPDS score 11.     No answer; message left.     TREY Reinoso  [facility]

## 2021-03-26 ENCOUNTER — TELEPHONE (OUTPATIENT)
Dept: CASE MANAGEMENT | Age: 21
End: 2021-03-26

## 2021-03-26 NOTE — TELEPHONE ENCOUNTER
Phone call to patient at [phone] due to EPDS score 11.     No answer; message left.     TREY Arteaga  [facility]

## 2022-03-18 PROBLEM — I50.9 CONGESTIVE HEART FAILURE (HCC): Status: ACTIVE | Noted: 2020-12-11

## 2022-03-19 PROBLEM — N90.4 VULVAR DYSTROPHY: Status: ACTIVE | Noted: 2020-12-01

## 2022-03-19 PROBLEM — R50.9 FEVER: Status: ACTIVE | Noted: 2020-12-11

## 2022-10-20 ENCOUNTER — CLINICAL DOCUMENTATION (OUTPATIENT)
Dept: OBGYN CLINIC | Age: 22
End: 2022-10-20

## 2024-04-16 NOTE — PROGRESS NOTES
Ante Partum High Risk Pregnancy Note    Patient admitted for hypertension and CHF and Pulmonary edema states she does not  have  headache , abdominal pain  , swelling and SOB. LOS:    Vitals: Temp (24hrs), Av.2 °F (37.3 °C), Min:98.5 °F (36.9 °C), Max:99.6 °F (37.6 °C)     Patient Vitals for the past 24 hrs:   BP   20 0811 135/87   20 0702 (!) 145/99   20 0647 (!) 149/100   20 0632 (!) 159/103   20 0617 (!) 152/96   20 0602 (!) 160/101   20 0555 (!) 162/103   20 0554 (!) 162/103   20 0538 (!) 142/102   20 2325 (!) 149/97   20 (!) 144/97   20 (!) 159/105   20 1644 139/89   20 1629 136/86   20 1614 132/85   20 1557 (!) 144/102   20 1552 (!) 148/104       I&O:    07 -  1900  In: 200 [P.O.:200]  Out: 1350 [Urine:1350]             12/10 1901 -  0700  In: 1400 [P.O.:1400]  Out: 3175 [Urine:3175]    Exam:  Patient without distress.                Abdomen: soft, non-tender               Fundus: soft and non tender              INCISION C/D/I   MINIMAL TO NO EDEMA        Lab/Data Review:  CMP:   Lab Results   Component Value Date/Time     2020 04:04 AM    K 3.3 (L) 2020 04:04 AM     2020 04:04 AM    CO2 23 2020 04:04 AM    AGAP 11 2020 04:04 AM    GLU 82 2020 04:04 AM    BUN 9 2020 04:04 AM    CREA 0.44 (L) 2020 04:04 AM    GFRAA >60 2020 04:04 AM    GFRNA >60 2020 04:04 AM    CA 8.3 2020 04:04 AM    ALB 2.6 (L) 2020 04:04 AM    TP 6.2 (L) 2020 04:04 AM    GLOB 3.6 (H) 2020 04:04 AM    AGRAT 0.7 (L) 2020 04:04 AM     (H) 2020 04:04 AM   PEr lab AST 49  CBC:   Lab Results   Component Value Date/Time    WBC 15.1 (H) 2020 04:04 AM    HGB 9.4 (L) 2020 04:04 AM    HCT 28.8 (L) 2020 04:04 AM     2020 04:04 AM       Assessment and Plan:      Principal Problem:    Acute CHF (congestive heart failure) ([facility] [address] (12/11/2020)      Overview: 12/11/2020 [facility] Consult-    Active Problems:    SOB (shortness of breath) (12/11/2020)      Fever (12/11/2020)      Postpartum edema (12/11/2020)      ELEVATED LIVER ENZYMES- LIKELY FROM CONGESTION- DISCUSSED WITH CHHAYA DOES NOT WANT TO START MAGNESIUM    Continue lasix apresoline replace k   Recheck labs in am  Pt and FOB very frustrated about having to stay-  stressed importance of severity of disease and potential for worsening illness without proper medical treatment  Elevated enzymes- per Mckinley Vázquez recheck  in am 23

## (undated) DEVICE — SUTURE VCRL SZ 1 L36IN ABSRB UD L36MM CTB-1 1/2 CIR BLNT JB947

## (undated) DEVICE — PREP SKN DURAPREP 26ML APPL --

## (undated) DEVICE — KENDALL SCD EXPRESS SLEEVES, KNEE LENGTH, MEDIUM: Brand: KENDALL SCD

## (undated) DEVICE — STERILE POLYISOPRENE POWDER-FREE SURGICAL GLOVES: Brand: PROTEXIS

## (undated) DEVICE — CATH FOL TY IC BAG 16FR 2000ML -- CONVERT TO ITEM 363158

## (undated) DEVICE — PENCIL ES L3M BTTN SWCH S STL HEX LOK BLDE ELECTRD HOLSTER

## (undated) DEVICE — AMD ANTIMICROBIAL NON-ADHERENT PAD,0.2% POLYHEXAMETHYLENE BIGUANIDE HCI (PHMB): Brand: TELFA

## (undated) DEVICE — REM POLYHESIVE ADULT PATIENT RETURN ELECTRODE: Brand: VALLEYLAB

## (undated) DEVICE — TRAY CATH 16FR PVC INTMIT STR TIP PREATTACH TO 1000ML COLL

## (undated) DEVICE — SUTURE VCRL SZ 0 L36IN ABSRB UD CTX-B 1/2 CIR BLNT PNT NDL JB978

## (undated) DEVICE — AMD ANTIMICROBIAL GAUZE SPONGES,12 PLY USP TYPE VII, 0.2% POLYHEXAMETHYLENE BIGUANIDE HCI (PHMB): Brand: CURITY

## (undated) DEVICE — SOLUTION IRRIG 1000ML H2O STRL BLT

## (undated) DEVICE — SURGICAL PROCEDURE PACK C SECT CDS

## (undated) DEVICE — KIWI® VACUUM DELIVERY SYSTEM, OMNICUP®: Brand: KIWI® OMNICUP®

## (undated) DEVICE — SUTURE VCRL 3-0 L36IN ABSRB UD CTB-1 L36MM 1/2 CIR NDL JB944

## (undated) DEVICE — SOLUTION IV 1000ML 0.9% SOD CHL

## (undated) DEVICE — SUTURE MCRYL SZ 4-0 L27IN ABSRB UD L19MM PS-2 1/2 CIR PRIM Y426H